# Patient Record
Sex: MALE | Race: WHITE | NOT HISPANIC OR LATINO | Employment: STUDENT | URBAN - METROPOLITAN AREA
[De-identification: names, ages, dates, MRNs, and addresses within clinical notes are randomized per-mention and may not be internally consistent; named-entity substitution may affect disease eponyms.]

---
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
## 2023-08-11 ENCOUNTER — APPOINTMENT (EMERGENCY)
Dept: CT IMAGING | Facility: HOSPITAL | Age: 18
End: 2023-08-11
Payer: COMMERCIAL

## 2023-08-11 ENCOUNTER — APPOINTMENT (EMERGENCY)
Dept: ULTRASOUND IMAGING | Facility: HOSPITAL | Age: 18
End: 2023-08-11
Payer: COMMERCIAL

## 2023-08-11 ENCOUNTER — HOSPITAL ENCOUNTER (EMERGENCY)
Facility: HOSPITAL | Age: 18
Discharge: HOME | End: 2023-08-11
Attending: EMERGENCY MEDICINE
Payer: COMMERCIAL

## 2023-08-11 ENCOUNTER — TELEPHONE (OUTPATIENT)
Dept: UROLOGY | Facility: AMBULATORY SURGERY CENTER | Age: 18
End: 2023-08-11

## 2023-08-11 VITALS
HEART RATE: 66 BPM | WEIGHT: 170 LBS | OXYGEN SATURATION: 97 % | DIASTOLIC BLOOD PRESSURE: 65 MMHG | RESPIRATION RATE: 18 BRPM | TEMPERATURE: 98.5 F | BODY MASS INDEX: 27.44 KG/M2 | SYSTOLIC BLOOD PRESSURE: 122 MMHG

## 2023-08-11 DIAGNOSIS — N45.1 EPIDIDYMITIS: ICD-10-CM

## 2023-08-11 DIAGNOSIS — R59.0 INGUINAL ADENOPATHY: Primary | ICD-10-CM

## 2023-08-11 DIAGNOSIS — D72.829 LEUKOCYTOSIS: ICD-10-CM

## 2023-08-11 LAB
ALBUMIN SERPL BCP-MCNC: 4.6 G/DL (ref 3.5–5)
ALP SERPL-CCNC: 57 U/L (ref 34–104)
ALT SERPL W P-5'-P-CCNC: 14 U/L (ref 7–52)
ANION GAP SERPL CALCULATED.3IONS-SCNC: 4 MMOL/L
AST SERPL W P-5'-P-CCNC: 21 U/L (ref 13–39)
BASOPHILS # BLD AUTO: 0.04 THOUSANDS/ÂΜL (ref 0–0.1)
BASOPHILS NFR BLD AUTO: 0 % (ref 0–1)
BILIRUB SERPL-MCNC: 0.88 MG/DL (ref 0.2–1)
BILIRUB UR QL STRIP: NEGATIVE
BUN SERPL-MCNC: 14 MG/DL (ref 5–25)
CALCIUM SERPL-MCNC: 9.2 MG/DL (ref 8.4–10.2)
CHLORIDE SERPL-SCNC: 108 MMOL/L (ref 96–108)
CLARITY UR: CLEAR
CO2 SERPL-SCNC: 28 MMOL/L (ref 21–32)
COLOR UR: YELLOW
CREAT SERPL-MCNC: 0.94 MG/DL (ref 0.6–1.3)
CRP SERPL QL: 1.6 MG/L
EOSINOPHIL # BLD AUTO: 0.06 THOUSAND/ÂΜL (ref 0–0.61)
EOSINOPHIL NFR BLD AUTO: 0 % (ref 0–6)
ERYTHROCYTE [DISTWIDTH] IN BLOOD BY AUTOMATED COUNT: 11.9 % (ref 11.6–15.1)
GFR SERPL CREATININE-BSD FRML MDRD: 116 ML/MIN/1.73SQ M
GLUCOSE SERPL-MCNC: 94 MG/DL (ref 65–140)
GLUCOSE UR STRIP-MCNC: NEGATIVE MG/DL
HCT VFR BLD AUTO: 42.8 % (ref 36.5–49.3)
HGB BLD-MCNC: 15.1 G/DL (ref 12–17)
HGB UR QL STRIP.AUTO: NEGATIVE
HIV 1+2 AB+HIV1 P24 AG SERPL QL IA: NORMAL
HIV1 P24 AG SER QL: NORMAL
IMM GRANULOCYTES # BLD AUTO: 0.06 THOUSAND/UL (ref 0–0.2)
IMM GRANULOCYTES NFR BLD AUTO: 0 % (ref 0–2)
KETONES UR STRIP-MCNC: NEGATIVE MG/DL
LACTATE SERPL-SCNC: 0.7 MMOL/L (ref 0.5–2)
LEUKOCYTE ESTERASE UR QL STRIP: NEGATIVE
LYMPHOCYTES # BLD AUTO: 1.33 THOUSANDS/ÂΜL (ref 0.6–4.47)
LYMPHOCYTES NFR BLD AUTO: 7 % (ref 14–44)
MCH RBC QN AUTO: 30.1 PG (ref 26.8–34.3)
MCHC RBC AUTO-ENTMCNC: 35.3 G/DL (ref 31.4–37.4)
MCV RBC AUTO: 85 FL (ref 82–98)
MONOCYTES # BLD AUTO: 0.9 THOUSAND/ÂΜL (ref 0.17–1.22)
MONOCYTES NFR BLD AUTO: 5 % (ref 4–12)
NEUTROPHILS # BLD AUTO: 16.11 THOUSANDS/ÂΜL (ref 1.85–7.62)
NEUTS SEG NFR BLD AUTO: 88 % (ref 43–75)
NITRITE UR QL STRIP: NEGATIVE
NRBC BLD AUTO-RTO: 0 /100 WBCS
PH UR STRIP.AUTO: 5.5 [PH]
PLATELET # BLD AUTO: 240 THOUSANDS/UL (ref 149–390)
PMV BLD AUTO: 10.3 FL (ref 8.9–12.7)
POTASSIUM SERPL-SCNC: 4 MMOL/L (ref 3.5–5.3)
PROCALCITONIN SERPL-MCNC: <0.05 NG/ML
PROT SERPL-MCNC: 7.3 G/DL (ref 6.4–8.4)
PROT UR STRIP-MCNC: NEGATIVE MG/DL
RBC # BLD AUTO: 5.02 MILLION/UL (ref 3.88–5.62)
SODIUM SERPL-SCNC: 140 MMOL/L (ref 135–147)
SP GR UR STRIP.AUTO: 1.02 (ref 1–1.03)
TREPONEMA PALLIDUM IGG+IGM AB [PRESENCE] IN SERUM OR PLASMA BY IMMUNOASSAY: NORMAL
URATE SERPL-MCNC: 5.8 MG/DL (ref 3.5–8.5)
UROBILINOGEN UR STRIP-ACNC: <2 MG/DL
WBC # BLD AUTO: 18.5 THOUSAND/UL (ref 4.31–10.16)

## 2023-08-11 PROCEDURE — 87040 BLOOD CULTURE FOR BACTERIA: CPT | Performed by: EMERGENCY MEDICINE

## 2023-08-11 PROCEDURE — 74177 CT ABD & PELVIS W/CONTRAST: CPT

## 2023-08-11 PROCEDURE — 80053 COMPREHEN METABOLIC PANEL: CPT | Performed by: EMERGENCY MEDICINE

## 2023-08-11 PROCEDURE — 83605 ASSAY OF LACTIC ACID: CPT | Performed by: EMERGENCY MEDICINE

## 2023-08-11 PROCEDURE — 36415 COLL VENOUS BLD VENIPUNCTURE: CPT | Performed by: EMERGENCY MEDICINE

## 2023-08-11 PROCEDURE — 76870 US EXAM SCROTUM: CPT

## 2023-08-11 PROCEDURE — 71260 CT THORAX DX C+: CPT

## 2023-08-11 PROCEDURE — 87181 SC STD AGAR DILUTION PER AGT: CPT | Performed by: EMERGENCY MEDICINE

## 2023-08-11 PROCEDURE — 87806 HIV AG W/HIV1&2 ANTB W/OPTIC: CPT | Performed by: EMERGENCY MEDICINE

## 2023-08-11 PROCEDURE — 87154 CUL TYP ID BLD PTHGN 6+ TRGT: CPT | Performed by: EMERGENCY MEDICINE

## 2023-08-11 PROCEDURE — 86140 C-REACTIVE PROTEIN: CPT | Performed by: EMERGENCY MEDICINE

## 2023-08-11 PROCEDURE — 84145 PROCALCITONIN (PCT): CPT | Performed by: EMERGENCY MEDICINE

## 2023-08-11 PROCEDURE — 86780 TREPONEMA PALLIDUM: CPT | Performed by: EMERGENCY MEDICINE

## 2023-08-11 PROCEDURE — 83615 LACTATE (LD) (LDH) ENZYME: CPT | Performed by: EMERGENCY MEDICINE

## 2023-08-11 PROCEDURE — 85025 COMPLETE CBC W/AUTO DIFF WBC: CPT | Performed by: EMERGENCY MEDICINE

## 2023-08-11 PROCEDURE — 87491 CHLMYD TRACH DNA AMP PROBE: CPT | Performed by: EMERGENCY MEDICINE

## 2023-08-11 PROCEDURE — 81003 URINALYSIS AUTO W/O SCOPE: CPT | Performed by: EMERGENCY MEDICINE

## 2023-08-11 PROCEDURE — 84550 ASSAY OF BLOOD/URIC ACID: CPT | Performed by: EMERGENCY MEDICINE

## 2023-08-11 PROCEDURE — 83625 ASSAY OF LDH ENZYMES: CPT | Performed by: EMERGENCY MEDICINE

## 2023-08-11 PROCEDURE — 87591 N.GONORRHOEAE DNA AMP PROB: CPT | Performed by: EMERGENCY MEDICINE

## 2023-08-11 RX ORDER — DOXYCYCLINE HYCLATE 100 MG/1
100 CAPSULE ORAL ONCE
Status: COMPLETED | OUTPATIENT
Start: 2023-08-11 | End: 2023-08-11

## 2023-08-11 RX ORDER — DOXYCYCLINE HYCLATE 100 MG/1
100 CAPSULE ORAL 2 TIMES DAILY
Qty: 20 CAPSULE | Refills: 0 | Status: SHIPPED | OUTPATIENT
Start: 2023-08-12 | End: 2023-08-15

## 2023-08-11 RX ORDER — KETOROLAC TROMETHAMINE 10 MG/1
10 TABLET, FILM COATED ORAL EVERY 6 HOURS PRN
Qty: 20 TABLET | Refills: 0 | Status: SHIPPED | OUTPATIENT
Start: 2023-08-11 | End: 2023-08-16

## 2023-08-11 RX ORDER — KETOROLAC TROMETHAMINE 30 MG/ML
30 INJECTION, SOLUTION INTRAMUSCULAR; INTRAVENOUS ONCE
Status: COMPLETED | OUTPATIENT
Start: 2023-08-11 | End: 2023-08-11

## 2023-08-11 RX ADMIN — IOHEXOL 100 ML: 350 INJECTION, SOLUTION INTRAVENOUS at 08:29

## 2023-08-11 RX ADMIN — LIDOCAINE HYDROCHLORIDE 500 MG: 10 INJECTION, SOLUTION EPIDURAL; INFILTRATION; INTRACAUDAL; PERINEURAL at 13:17

## 2023-08-11 RX ADMIN — KETOROLAC TROMETHAMINE 30 MG: 30 INJECTION, SOLUTION INTRAMUSCULAR; INTRAVENOUS at 13:16

## 2023-08-11 RX ADMIN — DOXYCYCLINE 100 MG: 100 CAPSULE ORAL at 13:15

## 2023-08-11 NOTE — QUICK NOTE
Urology covering [address] about patient presented to the emergency room for bilateral inguinal pain. He is tender on exam in the lymph node area. No skin changes or discharge. He has a history of inguinal lymphadenopathy    WBC: 18.5   WBC   Latest Ref Rng 4.31 - 10.16 Thousand/uL   12/19/2021 20.08 (H)    1/18/2022 15.01 (H)    5/2/2022 20.36 (H)    5/11/2022 12.62 (H)    6/2/2022 20.17 (H)    6/13/2022 7.00    8/11/2023 18.50 (H)         Urine unremarkable, uric acid, C-reactive protein, lactic acid, procalcitonin normal, HIV normal.  Syphilis, GC and chlamydia pending, blood cultures in process, EBV acute panel pending    Ultrasound scrotum and testicles unremarkable, exam benign per Dr. Parker [place]  CT scan:   IMPRESSION:     No splenomegaly or lymphadenopathy by size criteria.     Stable prominent but nonenlarged retroperitoneal lymph node since 1/18/2022. He had a cystoscopy August 2022 that was unremarkable with Dr. Reynaldo Ervin. He was seen by Dr. Francisca Turner July 2022. Had prior PET CT scan which showed prominent lymphadenopathy did not have any FDG uptake indicating probable reactive etiology however low-grade myeloproliferative disorder cannot be completely ruled out. At that time his WBC had returned to normal.  EGD was normal, colonoscopy was normal both completed July 2022. He was supposed to follow-up with a CT scan 3 months later which was never completed.     Reviewed with Dr. Aleah Barajas.,  Nothing from urological standpoint

## 2023-08-11 NOTE — TELEPHONE ENCOUNTER
Patient last seen on 8/18/22 with Dr. Funmilayo Rice in [place] ([place])    Patient's father stated [facility] wanted the patient to get a biopsy when his lymph nodes are swollen and he is currently in the ER in [place] because he is having a flare up     Patient's father calling to see if one of our provider's could call over to the [facility] to direct them to do this biopsy because he does not want to end up back in the ER again.  Patient's father gave the number [phone]    Patient's father can be reached at [phone]

## 2023-08-11 NOTE — ED PROVIDER NOTES
History  Chief Complaint   Patient presents with   • Groin Pain     Pt to er with complaints of the lymph nodes in his groin swelling. States that the pain is 10/10. Denies n/v. Pain does not radiate. This is a 78-year-old male who presents for evaluation of of recurrent inguinal adenopathy at least 12 episodes over the past 3 years without definitive etiology. Patient has had extensive workup including STD testing PET scan lymphoma blood evaluation without answers. He has also had EGD colonoscopy and cystoscopy. He was told to consider lymph node biopsy when he gets 1 of these acute flares. He is currently sexually active. Denies any other regions of adenopathy no nausea vomiting diarrhea fevers or chills. History provided by:  Patient and parent  Medical Problem  Location:  Inguinal  Quality:  Adenopathy  Severity:  Moderate  Onset quality:  Gradual  Timing:  Constant  Progression:  Waxing and waning  Chronicity:  Recurrent  Context:  Recurrent inguinal adenopathy  Associated symptoms: no fever        Prior to Admission Medications   Prescriptions Last Dose Informant Patient Reported? Taking?   ketorolac (TORADOL) 10 mg tablet  Self No No   Sig: Take 1 tablet (10 mg total) by mouth every 6 (six) hours as needed for moderate pain   ketorolac (TORADOL) 10 mg tablet   No Yes   Sig: Take 1 tablet (10 mg total) by mouth every 6 (six) hours as needed for moderate pain for up to 5 days   saccharomyces boulardii (FLORASTOR) 250 mg capsule   No No   Sig: Take 1 capsule (250 mg total) by mouth in the morning and 1 capsule (250 mg total) in the evening. Patient not taking: No sig reported      Facility-Administered Medications: None       History reviewed. No pertinent past medical history. History reviewed. No pertinent surgical history. History reviewed. No pertinent family history. I have reviewed and agree with the history as documented.     E-Cigarette/Vaping   • E-Cigarette Use Never User E-Cigarette/Vaping Substances   • Nicotine No    • THC No    • CBD No    • Flavoring No    • Other No    • Unknown No      Social History     Tobacco Use   • Smoking status: Never   • Smokeless tobacco: Never   Vaping Use   • Vaping Use: Never used   Substance Use Topics   • Alcohol use: Not Currently   • Drug use: Not Currently       Review of Systems   Constitutional: Negative for fever. Genitourinary: Negative for penile discharge. Inguinal adenopathy   All other systems reviewed and are negative. Physical Exam  Physical Exam  Constitutional:       General: He is not in acute distress. Appearance: He is not ill-appearing, toxic-appearing or diaphoretic. HENT:      Head: Normocephalic and atraumatic. Right Ear: External ear normal.      Left Ear: External ear normal.      Nose: Nose normal.   Eyes:      General:         Right eye: No discharge. Left eye: No discharge. Extraocular Movements: Extraocular movements intact. Pupils: Pupils are equal, round, and reactive to light. Cardiovascular:      Rate and Rhythm: Normal rate and regular rhythm. Pulses: Normal pulses. Heart sounds: No murmur heard. No friction rub. No gallop. Pulmonary:      Effort: Pulmonary effort is normal. No respiratory distress. Breath sounds: No stridor. No wheezing, rhonchi or rales. Abdominal:      General: There is no distension. Palpations: Abdomen is soft. Tenderness: There is no abdominal tenderness. There is no guarding or rebound. Genitourinary:     Penis: Normal.       Comments: No scrotal erythema or swelling some mild tenderness to palpation no genital lesions or discharge  Musculoskeletal:         General: No swelling, tenderness, deformity or signs of injury. Normal range of motion. Cervical back: Normal range of motion and neck supple. No rigidity or tenderness. Right lower leg: No edema. Left lower leg: No edema.    Skin: General: Skin is warm and dry. Coloration: Skin is not jaundiced. Findings: No bruising. Comments: Sunburn to the chest otherwise no skin lesions. There is no cervical or axillary adenopathy present on examination   Neurological:      General: No focal deficit present. Mental Status: He is alert and oriented to person, place, and time. Cranial Nerves: No cranial nerve deficit. Sensory: No sensory deficit. Motor: No weakness. Coordination: Coordination normal.      Gait: Gait normal.   Psychiatric:         Mood and Affect: Mood normal.         Behavior: Behavior normal.         Thought Content: Thought content normal.         Vital Signs  ED Triage Vitals   Temperature Pulse Respirations Blood Pressure SpO2   08/11/23 0640 08/11/23 0639 08/11/23 0639 08/11/23 0640 08/11/23 0639   98.5 °F (36.9 °C) 89 18 137/72 99 %      Temp Source Heart Rate Source Patient Position - Orthostatic VS BP Location FiO2 (%)   08/11/23 0640 08/11/23 0639 -- -- --   Oral Monitor         Pain Score       --                  Vitals:    08/11/23 0900 08/11/23 0930 08/11/23 1000 08/11/23 1030   BP: 121/58 116/66 107/57 105/61   Pulse: 84 83 66 59         Visual Acuity      ED Medications  Medications   cefTRIAXone (ROCEPHIN) 500 mg in lidocaine (PF) (XYLOCAINE-MPF) 1 % IM only syringe (has no administration in time range)   doxycycline hyclate (VIBRAMYCIN) capsule 100 mg (has no administration in time range)   ketorolac (TORADOL) injection 30 mg (has no administration in time range)   iohexol (OMNIPAQUE) 350 MG/ML injection (SINGLE-DOSE) 100 mL (100 mL Intravenous Given 8/11/23 0829)       Diagnostic Studies  Results Reviewed     Procedure Component Value Units Date/Time    Blood culture #1 [[id number]] Collected: 08/11/23 0806    Lab Status: Preliminary result Specimen: Blood from Arm, Left Updated: 08/11/23 1301     Blood Culture Received in Microbiology Lab. Culture in Progress.     Blood culture #2 [[id number]] Collected: 08/11/23 0752    Lab Status: Preliminary result Specimen: Blood from Arm, Right Updated: 08/11/23 1301     Blood Culture Received in Microbiology Lab. Culture in Progress. Lactate dehydrogenase, isoenzymes [[id number]] Collected: 08/11/23 1057    Lab Status: In process Specimen: Blood from Arm, Right Updated: 08/11/23 1104    Uric acid [[id number]]  (Normal) Collected: 08/11/23 0752    Lab Status: Final result Specimen: Blood from Arm, Right Updated: 08/11/23 1058     Uric Acid 5.8 mg/dL     Narrative:      N-acetyl-p-benzoquinone imine (metabolite of Acetaminophen) will generate erroneously low results in samples for patients that have taken an overdose of Acetaminophen. EBV acute panel [[id number]]     Lab Status: No result Specimen: Blood     UA w Reflex to Microscopic w Reflex to Culture [[id number]] Collected: 08/11/23 0806    Lab Status: Final result Specimen: Urine, Clean Catch Updated: 08/11/23 0851     Color, UA Yellow     Clarity, UA Clear     Specific Gravity, UA 1.025     pH, UA 5.5     Leukocytes, UA Negative     Nitrite, UA Negative     Protein, UA Negative mg/dl      Glucose, UA Negative mg/dl      Ketones, UA Negative mg/dl      Urobilinogen, UA <2.0 mg/dl      Bilirubin, UA Negative     Occult Blood, UA Negative    RAPID HIV 1/2 AB-AG COMBO for 15years old and above [[id number]]  (Normal) Collected: 08/11/23 0752    Lab Status: Final result Specimen: Blood from Arm, Right Updated: 08/11/23 0841     Rapid HIV 1 AND 2 Non-Reactive     HIV-1 P24 Ag Screen Non-Reactive    Narrative:      Negative for HIV-1 p24 Antigen. Negative for HIV-1 and/or HIV-2 Antibody.     Procalcitonin [[id number]]  (Normal) Collected: 08/11/23 0752    Lab Status: Final result Specimen: Blood from Arm, Right Updated: 08/11/23 0828     Procalcitonin <0.05 ng/ml     Comprehensive metabolic panel [[id number]] Collected: 08/11/23 0752    Lab Status: Final result Specimen: Blood from Arm, Right Updated: 08/11/23 0817     Sodium 140 mmol/L      Potassium 4.0 mmol/L      Chloride 108 mmol/L      CO2 28 mmol/L      ANION GAP 4 mmol/L      BUN 14 mg/dL      Creatinine 0.94 mg/dL      Glucose 94 mg/dL      Calcium 9.2 mg/dL      AST 21 U/L      ALT 14 U/L      Alkaline Phosphatase 57 U/L      Total Protein 7.3 g/dL      Albumin 4.6 g/dL      Total Bilirubin 0.88 mg/dL      eGFR 116 ml/min/1.73sq m     Narrative:      [place] guidelines for Chronic Kidney Disease (CKD):   •  Stage 1 with normal or high GFR (GFR > 90 mL/min/1.73 square meters)  •  Stage 2 Mild CKD (GFR = 60-89 mL/min/1.73 square meters)  •  Stage 3A Moderate CKD (GFR = 45-59 mL/min/1.73 square meters)  •  Stage 3B Moderate CKD (GFR = 30-44 mL/min/1.73 square meters)  •  Stage 4 Severe CKD (GFR = 15-29 mL/min/1.73 square meters)  •  Stage 5 End Stage CKD (GFR <15 mL/min/1.73 square meters)  Note: GFR calculation is accurate only with a steady state creatinine    C-reactive protein [[id number]]  (Normal) Collected: 08/11/23 0752    Lab Status: Final result Specimen: Blood from Arm, Right Updated: 08/11/23 0817     CRP 1.6 mg/L     Lactic acid, plasma (w/reflex if result > 2.0) [[id number]]  (Normal) Collected: 08/11/23 0752    Lab Status: Final result Specimen: Blood from Arm, Right Updated: 08/11/23 0815     LACTIC ACID 0.7 mmol/L     Narrative:      Result may be elevated if tourniquet was used during collection. David amplified DNA by PCR [[id number]] Collected: 08/11/23 0806    Lab Status:  In process Specimen: Urine, Other Updated: 08/11/23 0812    CBC and differential [[id number]]  (Abnormal) Collected: 08/11/23 0752    Lab Status: Final result Specimen: Blood from Arm, Right Updated: 08/11/23 0802     WBC 18.50 Thousand/uL      RBC 5.02 Million/uL      Hemoglobin 15.1 g/dL      Hematocrit 42.8 %      MCV 85 fL      MCH 30.1 pg      MCHC 35.3 g/dL      RDW 11.9 %      MPV 10.3 fL      Platelets 508 Thousands/uL nRBC 0 /100 WBCs      Neutrophils Relative 88 %      Immat GRANS % 0 %      Lymphocytes Relative 7 %      Monocytes Relative 5 %      Eosinophils Relative 0 %      Basophils Relative 0 %      Neutrophils Absolute 16.11 Thousands/µL      Immature Grans Absolute 0.06 Thousand/uL      Lymphocytes Absolute 1.33 Thousands/µL      Monocytes Absolute 0.90 Thousand/µL      Eosinophils Absolute 0.06 Thousand/µL      Basophils Absolute 0.04 Thousands/µL     RPR-Syphilis Screening (Total Syphilis IGG/IGM) [[id number]] Collected: 08/11/23 0752    Lab Status: In process Specimen: Blood from Arm, Right Updated: 08/11/23 0759                 US scrotum and testicles   Final Result by Blanca Emanuel MD (08/11 1201)      Unremarkable study. Workstation performed: [id number]         CT chest abdomen pelvis w contrast   Final Result by Kali Frazier MD (08/11 3051)      No splenomegaly or lymphadenopathy by size criteria. Stable prominent but nonenlarged retroperitoneal lymph node since 1/18/2022. Workstation performed: [id number]                    Procedures  Procedures         ED Course  ED Course as of 08/11/23 1308   Fri Aug 11, 2023   1241 Reviewed case with hematology oncology Dr. Gilbert Minor who recommends urology consultation. She would not recommend biopsy at this time and feels that the adenopathy is most likely reactionary. [phone] Also discussed case with urology covering [address] who states there is nothing from a urologic standpoint at this time. 1243 At this point we will treat patient with antibiotics and have him follow-up with his primary care physician for further evaluation   1307 Discussed case with patient's primary care Dr. Violette Khalil who will arrange for outpatient lymph node biopsy through general surgery         CRAFFT    Flowsheet Row Most Recent Value   CRAFFT Initial Screen: During the past 12 months, did you:    1. Drink any alcohol (more than a few sips)?   No Filed at: 08/11/2023 0640   2. Smoke any marijuana or hashish No Filed at: 08/11/2023 0640   3. Use anything else to get high? ("anything else" includes illegal drugs, over the counter and prescription drugs, and things that you sniff or 'brar')? No Filed at: 08/11/2023 0640                                          Medical Decision Making  Inguinal adenopathy differential includes lymphoma versus reactive infectious etiology STD workup in progress we will check lab work and CAT scan in addition to urinalysis    Amount and/or Complexity of Data Reviewed  Labs: ordered. Radiology: ordered. Disposition  Final diagnoses:   Inguinal adenopathy   Leukocytosis     Time reflects when diagnosis was documented in both MDM as applicable and the Disposition within this note     Time User Action Codes Description Comment    8/11/2023 12:14 PM Reola Adarsh Add [R59.0] Inguinal adenopathy     8/11/2023 12:14 PM Reola Adarsh Add [Y61.804] Leukocytosis     8/11/2023 12:46 PM Reola Adarsh Add [N45.1] Epididymitis       ED Disposition     ED Disposition   Discharge    Condition   Stable    Date/Time   Fri Aug 11, 2023 12:44 PM    Comment   Dusty Matias discharge to home/self care. Follow-up Information     Follow up With Specialties Details Why Contact [facility]  Today  [address] [place] [address],[address] Dr. Sifuentes Severe [id number] [phone]            Patient's Medications   Discharge Prescriptions    DOXYCYCLINE HYCLATE (VIBRAMYCIN) 100 MG CAPSULE    Take 1 capsule (100 mg total) by mouth 2 (two) times a day for 10 days Do not start before August 12, 2023. Start Date: 8/12/2023 End Date: 8/22/2023       Order Dose: 100 mg       Quantity: 20 capsule    Refills: 0       No discharge procedures on file.     PDMP Review     None          ED Provider  Electronically Signed by           Jose Bernal DO  08/11/23 6266

## 2023-08-12 ENCOUNTER — HOSPITAL ENCOUNTER (INPATIENT)
Facility: HOSPITAL | Age: 18
LOS: 3 days | Discharge: HOME | End: 2023-08-15
Attending: EMERGENCY MEDICINE | Admitting: STUDENT IN AN ORGANIZED HEALTH CARE EDUCATION/TRAINING PROGRAM
Payer: COMMERCIAL

## 2023-08-12 DIAGNOSIS — R51.9 NONINTRACTABLE EPISODIC HEADACHE, UNSPECIFIED HEADACHE TYPE: ICD-10-CM

## 2023-08-12 DIAGNOSIS — R59.0 INGUINAL LYMPHADENOPATHY: ICD-10-CM

## 2023-08-12 DIAGNOSIS — R78.81 BACTEREMIA: Primary | ICD-10-CM

## 2023-08-12 LAB
ALBUMIN SERPL BCP-MCNC: 4.5 G/DL (ref 3.5–5)
ALP SERPL-CCNC: 58 U/L (ref 34–104)
ALT SERPL W P-5'-P-CCNC: 15 U/L (ref 7–52)
ANION GAP SERPL CALCULATED.3IONS-SCNC: 7 MMOL/L
APTT PPP: 28 SECONDS (ref 23–37)
AST SERPL W P-5'-P-CCNC: 20 U/L (ref 13–39)
BASOPHILS # BLD AUTO: 0.05 THOUSANDS/ÂΜL (ref 0–0.1)
BASOPHILS NFR BLD AUTO: 1 % (ref 0–1)
BILIRUB SERPL-MCNC: 0.47 MG/DL (ref 0.2–1)
BUN SERPL-MCNC: 13 MG/DL (ref 5–25)
CALCIUM SERPL-MCNC: 9.1 MG/DL (ref 8.4–10.2)
CARDIAC TROPONIN I PNL SERPL HS: <2 NG/L
CHLORIDE SERPL-SCNC: 107 MMOL/L (ref 96–108)
CO2 SERPL-SCNC: 26 MMOL/L (ref 21–32)
CREAT SERPL-MCNC: 1.04 MG/DL (ref 0.6–1.3)
EOSINOPHIL # BLD AUTO: 0.15 THOUSAND/ÂΜL (ref 0–0.61)
EOSINOPHIL NFR BLD AUTO: 2 % (ref 0–6)
ERYTHROCYTE [DISTWIDTH] IN BLOOD BY AUTOMATED COUNT: 11.9 % (ref 11.6–15.1)
GFR SERPL CREATININE-BSD FRML MDRD: 102 ML/MIN/1.73SQ M
GLUCOSE SERPL-MCNC: 100 MG/DL (ref 65–140)
HCT VFR BLD AUTO: 42 % (ref 36.5–49.3)
HGB BLD-MCNC: 14.7 G/DL (ref 12–17)
IMM GRANULOCYTES # BLD AUTO: 0.02 THOUSAND/UL (ref 0–0.2)
IMM GRANULOCYTES NFR BLD AUTO: 0 % (ref 0–2)
INR PPP: 0.99 (ref 0.84–1.19)
LACTATE SERPL-SCNC: 1.5 MMOL/L (ref 0.5–2)
LYMPHOCYTES # BLD AUTO: 1.95 THOUSANDS/ÂΜL (ref 0.6–4.47)
LYMPHOCYTES NFR BLD AUTO: 25 % (ref 14–44)
MCH RBC QN AUTO: 29.8 PG (ref 26.8–34.3)
MCHC RBC AUTO-ENTMCNC: 35 G/DL (ref 31.4–37.4)
MCV RBC AUTO: 85 FL (ref 82–98)
MONOCYTES # BLD AUTO: 0.61 THOUSAND/ÂΜL (ref 0.17–1.22)
MONOCYTES NFR BLD AUTO: 8 % (ref 4–12)
NEUTROPHILS # BLD AUTO: 5 THOUSANDS/ÂΜL (ref 1.85–7.62)
NEUTS SEG NFR BLD AUTO: 64 % (ref 43–75)
NRBC BLD AUTO-RTO: 0 /100 WBCS
PLATELET # BLD AUTO: 260 THOUSANDS/UL (ref 149–390)
PMV BLD AUTO: 10.5 FL (ref 8.9–12.7)
POTASSIUM SERPL-SCNC: 3.6 MMOL/L (ref 3.5–5.3)
PROCALCITONIN SERPL-MCNC: 0.19 NG/ML
PROT SERPL-MCNC: 7.3 G/DL (ref 6.4–8.4)
PROTHROMBIN TIME: 13.8 SECONDS (ref 11.6–14.5)
RBC # BLD AUTO: 4.93 MILLION/UL (ref 3.88–5.62)
SODIUM SERPL-SCNC: 140 MMOL/L (ref 135–147)
WBC # BLD AUTO: 7.78 THOUSAND/UL (ref 4.31–10.16)

## 2023-08-12 PROCEDURE — 85025 COMPLETE CBC W/AUTO DIFF WBC: CPT | Performed by: EMERGENCY MEDICINE

## 2023-08-12 PROCEDURE — 85610 PROTHROMBIN TIME: CPT | Performed by: EMERGENCY MEDICINE

## 2023-08-12 PROCEDURE — 85730 THROMBOPLASTIN TIME PARTIAL: CPT | Performed by: EMERGENCY MEDICINE

## 2023-08-12 PROCEDURE — 87040 BLOOD CULTURE FOR BACTERIA: CPT | Performed by: EMERGENCY MEDICINE

## 2023-08-12 PROCEDURE — 84484 ASSAY OF TROPONIN QUANT: CPT | Performed by: EMERGENCY MEDICINE

## 2023-08-12 PROCEDURE — 99223 1ST HOSP IP/OBS HIGH 75: CPT | Performed by: STUDENT IN AN ORGANIZED HEALTH CARE EDUCATION/TRAINING PROGRAM

## 2023-08-12 PROCEDURE — 96365 THER/PROPH/DIAG IV INF INIT: CPT

## 2023-08-12 PROCEDURE — 99284 EMERGENCY DEPT VISIT MOD MDM: CPT

## 2023-08-12 PROCEDURE — 36415 COLL VENOUS BLD VENIPUNCTURE: CPT | Performed by: EMERGENCY MEDICINE

## 2023-08-12 PROCEDURE — 83605 ASSAY OF LACTIC ACID: CPT | Performed by: EMERGENCY MEDICINE

## 2023-08-12 PROCEDURE — 80053 COMPREHEN METABOLIC PANEL: CPT | Performed by: EMERGENCY MEDICINE

## 2023-08-12 PROCEDURE — 84145 PROCALCITONIN (PCT): CPT | Performed by: EMERGENCY MEDICINE

## 2023-08-12 PROCEDURE — 93005 ELECTROCARDIOGRAM TRACING: CPT

## 2023-08-12 PROCEDURE — 99285 EMERGENCY DEPT VISIT HI MDM: CPT | Performed by: EMERGENCY MEDICINE

## 2023-08-12 RX ORDER — MAGNESIUM HYDROXIDE/ALUMINUM HYDROXICE/SIMETHICONE 120; 1200; 1200 MG/30ML; MG/30ML; MG/30ML
30 SUSPENSION ORAL EVERY 6 HOURS PRN
Status: DISCONTINUED | OUTPATIENT
Start: 2023-08-12 | End: 2023-08-15 | Stop reason: HOSPADM

## 2023-08-12 RX ORDER — CEFTRIAXONE 1 G/50ML
1000 INJECTION, SOLUTION INTRAVENOUS EVERY 24 HOURS
Status: DISCONTINUED | OUTPATIENT
Start: 2023-08-13 | End: 2023-08-15

## 2023-08-12 RX ORDER — ONDANSETRON 2 MG/ML
4 INJECTION INTRAMUSCULAR; INTRAVENOUS EVERY 4 HOURS PRN
Status: DISCONTINUED | OUTPATIENT
Start: 2023-08-12 | End: 2023-08-15 | Stop reason: HOSPADM

## 2023-08-12 RX ORDER — POLYETHYLENE GLYCOL 3350 17 G/17G
17 POWDER, FOR SOLUTION ORAL DAILY PRN
Status: DISCONTINUED | OUTPATIENT
Start: 2023-08-12 | End: 2023-08-15 | Stop reason: HOSPADM

## 2023-08-12 RX ORDER — ACETAMINOPHEN 325 MG/1
650 TABLET ORAL EVERY 6 HOURS PRN
Status: DISCONTINUED | OUTPATIENT
Start: 2023-08-12 | End: 2023-08-15 | Stop reason: HOSPADM

## 2023-08-12 RX ORDER — CEFTRIAXONE 1 G/50ML
1000 INJECTION, SOLUTION INTRAVENOUS ONCE
Status: COMPLETED | OUTPATIENT
Start: 2023-08-12 | End: 2023-08-12

## 2023-08-12 RX ORDER — LANOLIN ALCOHOL/MO/W.PET/CERES
6 CREAM (GRAM) TOPICAL
Status: DISCONTINUED | OUTPATIENT
Start: 2023-08-12 | End: 2023-08-15 | Stop reason: HOSPADM

## 2023-08-12 RX ORDER — KETOROLAC TROMETHAMINE 30 MG/ML
15 INJECTION, SOLUTION INTRAMUSCULAR; INTRAVENOUS EVERY 6 HOURS PRN
Status: DISPENSED | OUTPATIENT
Start: 2023-08-12 | End: 2023-08-14

## 2023-08-12 RX ADMIN — CEFTRIAXONE 1000 MG: 1 INJECTION, SOLUTION INTRAVENOUS at 11:21

## 2023-08-12 RX ADMIN — KETOROLAC TROMETHAMINE 15 MG: 30 INJECTION, SOLUTION INTRAMUSCULAR; INTRAVENOUS at 14:31

## 2023-08-12 RX ADMIN — MELATONIN 6 MG: at 22:22

## 2023-08-12 RX ADMIN — KETOROLAC TROMETHAMINE 15 MG: 30 INJECTION, SOLUTION INTRAMUSCULAR; INTRAVENOUS at 21:04

## 2023-08-12 NOTE — ED PROVIDER NOTES
History  Chief Complaint   Patient presents with   • Abnormal Lab     Pt was called by pcp due to blood cultures being positive. Pt denies any complaints at this time. 19year old male presents for re-evaluation following positive blood cultures. Patient had presented in this emergency department yesterday for recurrent inguinal lymphadenopathy and had been discharged on doxycycline. Patient states the lymphadenopathy has improved since leaving the ED yesterday. No fevers or chills. No recent cough, congestion, nausea, vomiting or diarrhea. Patient has an outpatient biopsy of his inguinal nodes scheduled for Tuesday. Prior to Admission Medications   Prescriptions Last Dose Informant Patient Reported? Taking?   doxycycline hyclate (VIBRAMYCIN) 100 mg capsule   No No   Sig: Take 1 capsule (100 mg total) by mouth 2 (two) times a day for 10 days Do not start before August 12, 2023. ketorolac (TORADOL) 10 mg tablet   No No   Sig: Take 1 tablet (10 mg total) by mouth every 6 (six) hours as needed for moderate pain for up to 5 days   saccharomyces boulardii (FLORASTOR) 250 mg capsule   No No   Sig: Take 1 capsule (250 mg total) by mouth in the morning and 1 capsule (250 mg total) in the evening. Patient not taking: No sig reported      Facility-Administered Medications: None       History reviewed. No pertinent past medical history. History reviewed. No pertinent surgical history. History reviewed. No pertinent family history. I have reviewed and agree with the history as documented.     E-Cigarette/Vaping   • E-Cigarette Use Never User      E-Cigarette/Vaping Substances   • Nicotine No    • THC No    • CBD No    • Flavoring No    • Other No    • Unknown No      Social History     Tobacco Use   • Smoking status: Never   • Smokeless tobacco: Never   Vaping Use   • Vaping Use: Never used   Substance Use Topics   • Alcohol use: Not Currently   • Drug use: Not Currently       Review of Systems    Physical Exam  Physical Exam  Vitals and nursing note reviewed. HENT:      Head: Normocephalic and atraumatic. Eyes:      Conjunctiva/sclera: Conjunctivae normal.   Cardiovascular:      Rate and Rhythm: Normal rate and regular rhythm. Pulses: Normal pulses. Pulmonary:      Effort: Pulmonary effort is normal. No respiratory distress. Abdominal:      General: There is no distension. Genitourinary:     Comments: Tender bilateral inguinal lymphadenopathy  Skin:     General: Skin is warm and dry. Neurological:      Mental Status: He is alert.          Vital Signs  ED Triage Vitals [08/12/23 1042]   Temperature Pulse Respirations Blood Pressure SpO2   98.2 °F (36.8 °C) 75 18 130/80 99 %      Temp Source Heart Rate Source Patient Position - Orthostatic VS BP Location FiO2 (%)   Oral -- -- -- --      Pain Score       --           Vitals:    08/12/23 1042   BP: 130/80   Pulse: 75         Visual Acuity      ED Medications  Medications   cefTRIAXone (ROCEPHIN) IVPB (premix in dextrose) 1,000 mg 50 mL (1,000 mg Intravenous New Bag 8/12/23 1121)       Diagnostic Studies  Results Reviewed     Procedure Component Value Units Date/Time    Procalcitonin [[id number]]  (Normal) Collected: 08/12/23 1057    Lab Status: Final result Specimen: Blood from Arm, Right Updated: 08/12/23 1134     Procalcitonin 0.19 ng/ml     HS Troponin 0hr (reflex protocol) [[id number]]  (Normal) Collected: 08/12/23 1057    Lab Status: Final result Specimen: Blood from Arm, Right Updated: 08/12/23 1132     hs TnI 0hr <2 ng/L     HS Troponin I 2hr [[id number]]     Lab Status: No result Specimen: Blood     Comprehensive metabolic panel [[id number]] Collected: 08/12/23 1057    Lab Status: Final result Specimen: Blood from Arm, Right Updated: 08/12/23 1119     Sodium 140 mmol/L      Potassium 3.6 mmol/L      Chloride 107 mmol/L      CO2 26 mmol/L      ANION GAP 7 mmol/L      BUN 13 mg/dL      Creatinine 1.04 mg/dL      Glucose 100 mg/dL Calcium 9.1 mg/dL      AST 20 U/L      ALT 15 U/L      Alkaline Phosphatase 58 U/L      Total Protein 7.3 g/dL      Albumin 4.5 g/dL      Total Bilirubin 0.47 mg/dL      eGFR 102 ml/min/1.73sq m     Narrative:      [facility] guidelines for Chronic Kidney Disease (CKD):   •  Stage 1 with normal or high GFR (GFR > 90 mL/min/1.73 square meters)  •  Stage 2 Mild CKD (GFR = 60-89 mL/min/1.73 square meters)  •  Stage 3A Moderate CKD (GFR = 45-59 mL/min/1.73 square meters)  •  Stage 3B Moderate CKD (GFR = 30-44 mL/min/1.73 square meters)  •  Stage 4 Severe CKD (GFR = 15-29 mL/min/1.73 square meters)  •  Stage 5 End Stage CKD (GFR <15 mL/min/1.73 square meters)  Note: GFR calculation is accurate only with a steady state creatinine    Lactic acid [[id number]]  (Normal) Collected: 08/12/23 1057    Lab Status: Final result Specimen: Blood from Arm, Right Updated: 08/12/23 1118     LACTIC ACID 1.5 mmol/L     Narrative:      Result may be elevated if tourniquet was used during collection.     Protime-INR [[id number]]  (Normal) Collected: 08/12/23 1057    Lab Status: Final result Specimen: Blood from Arm, Right Updated: 08/12/23 1118     Protime 13.8 seconds      INR 0.99    APTT [[id number]]  (Normal) Collected: 08/12/23 1057    Lab Status: Final result Specimen: Blood from Arm, Right Updated: 08/12/23 1118     PTT 28 seconds     CBC and differential [[id number]] Collected: 08/12/23 1057    Lab Status: Final result Specimen: Blood from Arm, Right Updated: 08/12/23 1105     WBC 7.78 Thousand/uL      RBC 4.93 Million/uL      Hemoglobin 14.7 g/dL      Hematocrit 42.0 %      MCV 85 fL      MCH 29.8 pg      MCHC 35.0 g/dL      RDW 11.9 %      MPV 10.5 fL      Platelets 993 Thousands/uL      nRBC 0 /100 WBCs      Neutrophils Relative 64 %      Immat GRANS % 0 %      Lymphocytes Relative 25 %      Monocytes Relative 8 %      Eosinophils Relative 2 %      Basophils Relative 1 %      Neutrophils Absolute 5.00 Thousands/µL      Immature Grans Absolute 0.02 Thousand/uL      Lymphocytes Absolute 1.95 Thousands/µL      Monocytes Absolute 0.61 Thousand/µL      Eosinophils Absolute 0.15 Thousand/µL      Basophils Absolute 0.05 Thousands/µL     Blood culture #1 [[id number]] Collected: 08/12/23 1057    Lab Status: In process Specimen: Blood from Arm, Right Updated: 08/12/23 1102    Blood culture #2 [[id number]] Collected: 08/12/23 1058    Lab Status: In process Specimen: Blood from Arm, Left Updated: 08/12/23 1102                 No orders to display              Procedures  ECG 12 Lead Documentation Only    Date/Time: 8/12/2023 11:02 AM    Performed by: Kristan Hartley MD  Authorized by: Kristan Hartley MD    Indications / Diagnosis:  Bacteremia  ECG reviewed by me, the ED Provider: yes    Patient location:  ED  Previous ECG:     Previous ECG:  Compared to current    Comparison ECG info:  12/19/21 sinus tachycardia    Similarity:  No change  Interpretation:     Interpretation: normal    Rate:     ECG rate:  75    ECG rate assessment: normal    Rhythm:     Rhythm: sinus rhythm    Ectopy:     Ectopy: none    QRS:     QRS axis:  Normal    QRS intervals:  Normal  Conduction:     Conduction: normal    ST segments:     ST segments:  Normal  T waves:     T waves: normal               ED Course         CRAFFT    Flowsheet Row Most Recent Value   CRAFFT Initial Screen: During the past 12 months, did you:    1. Drink any alcohol (more than a few sips)? No Filed at: 08/12/2023 1141   2. Smoke any marijuana or hashish No Filed at: 08/12/2023 1141   3. Use anything else to get high? ("anything else" includes illegal drugs, over the counter and prescription drugs, and things that you sniff or 'brar')? No Filed at: 08/12/2023 1141                         Initial Sepsis Screening     Row Name 08/12/23 1127                Is the patient's history suggestive of a new or worsening infection?  Yes (Proceed)  -EE        Suspected source of infection suspect infection, source unknown  bacteremia  -EE        Indicate SIRS criteria --        Are two or more of the above signs & symptoms of infection both present and new to the patient? No  -EE              User Key  (r) = Recorded By, (t) = Taken By, (c) = Cosigned By    [facility] Name Provider Chico Wells MD Physician                              Medical Decision Making  19year old male presents for re-evaluation following 2/2 positive blood cultures which were drawn yesterday during a visit for inguinal lymphadenopathy. No current SIRS criteria. Per medical records, blood cultures grew Streptococcus agalactiae (Group B). IV Rocephin ordered. Patient admitted for further evaluation and management. Amount and/or Complexity of Data Reviewed  Labs: ordered. Risk  Prescription drug management. Disposition  Final diagnoses:   Bacteremia   Inguinal lymphadenopathy     Time reflects when diagnosis was documented in both MDM as applicable and the Disposition within this note     Time User Action Codes Description Comment    8/12/2023 11:17 AM Dane Melara Add [R78.81] Bacteremia     8/12/2023 11:18 AM Dane Duke [R59.0] Inguinal lymphadenopathy       ED Disposition     ED Disposition   Admit    Condition   Stable    Date/Time   Sat Aug 12, 2023 11:42 AM    Comment   Case was discussed with AUGUSTIN and the patient's admission status was agreed to be Admission Status: inpatient status to the service of Dr. Diego Machado . Follow-up Information    None         Patient's Medications   Discharge Prescriptions    No medications on file       No discharge procedures on file.     PDMP Review     None          ED Provider  Electronically Signed by           Beata Alexander MD  08/12/23 4195

## 2023-08-12 NOTE — RESULT ENCOUNTER NOTE
Called patient and informed him of positive blood cultures. He states he is currently out of town. I did advise him to go to the closest ER for further evaluation. He states he will try to go today, but if not he will return here tomorrow. He states he is currently on antibiotics and feeling better. Patient aware of the importance of getting rechecked.

## 2023-08-12 NOTE — ASSESSMENT & PLAN NOTE
Patient presenting today due to abnormal blood cultures  1/2 growing GBS    Patient presented on 8/11/2023 to the ED for growing pain that was 10 out of 10. Patient has a history of recurrent inguinal lymphadenopathy and has been followed up with urology, hematology oncology. He has had extensive work-up with PET scan multiple blood tests colonoscopy cystoscopy. Yesterday urology and oncology were notified and did not recommend any further intervention at this time and was to follow-up with his PCP for outpatient lymph node biopsy. Pt at the time did NOT meet SIRS criteria pt only had leukocytosis of 18.5 he received ceftriaxone and Toradol in the ED. Was discharged with Doxy.     Bld work today unremarkable  LA and procal negative  NOT meeting SIRS criteria  Repeat BC taken in the ED-> continue to follow  Was given CTX in the ED-> continue  F/u GC/chlamydia

## 2023-08-12 NOTE — PLAN OF CARE
Problem: PAIN - ADULT  Goal: Verbalizes/displays adequate comfort level or baseline comfort level  Description: Interventions:  - Encourage patient to monitor pain and request assistance  - Assess pain using appropriate pain scale  - Administer analgesics based on type and severity of pain and evaluate response  - Implement non-pharmacological measures as appropriate and evaluate response  - Consider cultural and social influences on pain and pain management  - Notify physician/advanced practitioner if interventions unsuccessful or patient reports new pain  Outcome: Progressing     Problem: SAFETY ADULT  Goal: Patient will remain free of falls  Description: INTERVENTIONS:  - Educate patient/family on patient safety including physical limitations  - Instruct patient to call for assistance with activity   - Consult OT/PT to assist with strengthening/mobility   - Keep Call bell within reach  - Keep bed low and locked with side rails adjusted as appropriate  - Keep care items and personal belongings within reach  - Initiate and maintain comfort rounds  - Make Fall Risk Sign visible to staff    - Apply yellow socks and bracelet for high fall risk patients  - Consider moving patient to room near nurses station  Outcome: Progressing

## 2023-08-12 NOTE — H&P
[facility]  H&P  Name: Gage Farrell 19 y.o. male I MRN: [id number]  Unit/Bed#: ED 08 I Date of Admission: 8/12/2023   Date of Service: 8/12/2023 I Hospital Day: 0      Assessment/Plan   * Positive blood culture  Assessment & Plan  Patient presenting today due to abnormal blood cultures  1/2 growing GBS    Patient presented on 8/11/2023 to the ED for growing pain that was 10 out of 10. Patient has a history of recurrent inguinal lymphadenopathy and has been followed up with urology, hematology oncology. He has had extensive work-up with PET scan multiple blood tests colonoscopy cystoscopy. Yesterday urology and oncology were notified and did not recommend any further intervention at this time and was to follow-up with his PCP for outpatient lymph node biopsy. Pt at the time did NOT meet SIRS criteria pt only had leukocytosis of 18.5    Bld work today unremarkable  LA and procal negative  NOT meeting SIRS criteria  Repeat BC taken in the ED-> continue to follow  Was given CTX in the ED-> continue  F/u GC/chlamydia     Inguinal lymphadenopathy  Assessment & Plan  See mgx above        VTE Pharmacologic Prophylaxis: VTE Score: 2 Low Risk (Score 0-2) - Encourage Ambulation. Code Status: No Order full code      Anticipated Length of Stay: Patient will be admitted on an inpatient basis with an anticipated length of stay of greater than 2 midnights secondary to + BC. Total Time Spent on Date of Encounter in care of patient: 65 minutes This time was spent on one or more of the following: performing physical exam; counseling and coordination of care; obtaining or reviewing history; documenting in the medical record; reviewing/ordering tests, medications or procedures; communicating with other healthcare professionals and discussing with patient's family/caregivers.     Chief Complaint: abnormal lab    History of Present Illness:  Gage Farrell is a 19 y.o. male with a PMH of inguinal lymphadenopathy who presents with abnormal lab of positive blood culture. He presented to the ED yesterday for severe groin pain. Has a history of inguinal lymphadenopathy. At the time patient did not meet SIRS criteria and had a leukocytosis. He was given ceftriaxone and Toradol in the ED. Hematology oncology as well as urology was consulted. They recommended outpatient follow-up. He was discharged with doxycycline. All other symptoms on review of systems was negative. .    Review of Systems:  Review of Systems    Past Medical and Surgical History:   History reviewed. No pertinent past medical history. History reviewed. No pertinent surgical history. Meds/Allergies:  Prior to Admission medications    Medication Sig Start Date End Date Taking? Authorizing Provider   doxycycline hyclate (VIBRAMYCIN) 100 mg capsule Take 1 capsule (100 mg total) by mouth 2 (two) times a day for 10 days Do not start before August 12, 2023. 8/12/23 8/22/23  Magy Khan DO   ketorolac (TORADOL) 10 mg tablet Take 1 tablet (10 mg total) by mouth every 6 (six) hours as needed for moderate pain for up to 5 days 8/11/23 8/16/23  Magy Khan DO   saccharomyces boulardii (FLORASTOR) 250 mg capsule Take 1 capsule (250 mg total) by mouth in the morning and 1 capsule (250 mg total) in the evening. Patient not taking: No sig reported 5/20/22 2000 SAMIR Campos-C     I have reviewed home medications using recent Epic encounter.     Allergies: No Known Allergies    Social History:  Marital Status: Single   Patient Pre-hospital Living Situation: Home  Patient Pre-hospital Level of Mobility: walks  Patient Pre-hospital Diet Restrictions: none  Substance Use History:   Social History     Substance and Sexual Activity   Alcohol Use Not Currently     Social History     Tobacco Use   Smoking Status Never   Smokeless Tobacco Never     Social History     Substance and Sexual Activity   Drug Use Not Currently       Family History:  History reviewed. No pertinent family history. Physical Exam:     Vitals:   Blood Pressure: 130/80 (08/12/23 1042)  Pulse: 75 (08/12/23 1042)  Temperature: 98.2 °F (36.8 °C) (08/12/23 1042)  Temp Source: Oral (08/12/23 1042)  Respirations: 18 (08/12/23 1042)  SpO2: 99 % (08/12/23 1042)    Physical Exam  Vitals and nursing note reviewed. Constitutional:       General: He is not in acute distress. Appearance: Normal appearance. He is not ill-appearing. HENT:      Head: Normocephalic and atraumatic. Cardiovascular:      Rate and Rhythm: Normal rate and regular rhythm. Pulses: Normal pulses. Heart sounds: Normal heart sounds. Pulmonary:      Effort: Pulmonary effort is normal.      Breath sounds: Normal breath sounds. Abdominal:      General: Abdomen is flat. Bowel sounds are normal.      Palpations: Abdomen is soft. Musculoskeletal:      Right lower leg: No edema. Left lower leg: No edema. Skin:     General: Skin is warm. Neurological:      General: No focal deficit present. Mental Status: He is alert and oriented to person, place, and time.           Additional Data:     Lab Results:  Results from last 7 days   Lab Units 08/12/23  1057   WBC Thousand/uL 7.78   HEMOGLOBIN g/dL 14.7   HEMATOCRIT % 42.0   PLATELETS Thousands/uL 260   NEUTROS PCT % 64   LYMPHS PCT % 25   MONOS PCT % 8   EOS PCT % 2     Results from last 7 days   Lab Units 08/12/23  1057   SODIUM mmol/L 140   POTASSIUM mmol/L 3.6   CHLORIDE mmol/L 107   CO2 mmol/L 26   BUN mg/dL 13   CREATININE mg/dL 1.04   ANION GAP mmol/L 7   CALCIUM mg/dL 9.1   ALBUMIN g/dL 4.5   TOTAL BILIRUBIN mg/dL 0.47   ALK PHOS U/L 58   ALT U/L 15   AST U/L 20   GLUCOSE RANDOM mg/dL 100     Results from last 7 days   Lab Units 08/12/23  1057   INR  0.99             Results from last 7 days   Lab Units 08/12/23  1057 08/11/23  0752   LACTIC ACID mmol/L 1.5 0.7   PROCALCITONIN ng/ml 0.19 <0.05       Lines/Drains:  Invasive Devices Peripheral Intravenous Line  Duration           Peripheral IV 06/02/22 Right Antecubital 436 days                    Imaging:   No orders to display   · Scrotal ultrasound: Unremarkable study  · CT chest abdomen pelvis with contrast: No splenomegaly or lymphadenopathy by size criteria. Stable prominent but nonenlarged retroperitoneal lymph node since 1/18/2022    EKG and Other Studies Reviewed on Admission:   · EKG Sinus rhythm with marked sinus arrhythmia   Otherwise normal ECG   When compared with ECG of 19-DEC-2021 19:15,   Vent. rate has decreased BY  38 BPM    ** Please Note: This note has been constructed using a voice recognition system.  **

## 2023-08-12 NOTE — ASSESSMENT & PLAN NOTE
Patient presenting today due to abnormal blood cultures  1/2 growing GBS sensitive to CTX    Patient presented on 8/11/2023 to the ED for growing pain that was 10 out of 10. Patient has a history of recurrent inguinal lymphadenopathy and has been followed up with urology, hematology oncology. He has had extensive work-up with PET scan multiple blood tests colonoscopy cystoscopy. Yesterday urology and oncology were notified and did not recommend any further intervention at this time and was to follow-up with his PCP for outpatient lymph node biopsy. Pt at the time did NOT meet SIRS criteria pt only had leukocytosis of 18.5 he received ceftriaxone and Toradol in the ED. Was discharged with Doxy.     Bld work today unremarkable  LA and procal negative  NOT meeting SIRS criteria  Repeat BC pending  continue CTX day 1  GC/chlamydia pending

## 2023-08-13 PROBLEM — R51.9 NONINTRACTABLE EPISODIC HEADACHE: Status: ACTIVE | Noted: 2023-08-13

## 2023-08-13 LAB
ALBUMIN SERPL BCP-MCNC: 4 G/DL (ref 3.5–5)
ALP SERPL-CCNC: 54 U/L (ref 34–104)
ALT SERPL W P-5'-P-CCNC: 13 U/L (ref 7–52)
ANION GAP SERPL CALCULATED.3IONS-SCNC: 4 MMOL/L
AST SERPL W P-5'-P-CCNC: 15 U/L (ref 13–39)
ATRIAL RATE: 75 BPM
BACTERIA BLD CULT: ABNORMAL
BILIRUB SERPL-MCNC: 0.5 MG/DL (ref 0.2–1)
BUN SERPL-MCNC: 13 MG/DL (ref 5–25)
CALCIUM SERPL-MCNC: 8.9 MG/DL (ref 8.4–10.2)
CHLORIDE SERPL-SCNC: 111 MMOL/L (ref 96–108)
CO2 SERPL-SCNC: 27 MMOL/L (ref 21–32)
CREAT SERPL-MCNC: 0.91 MG/DL (ref 0.6–1.3)
ERYTHROCYTE [DISTWIDTH] IN BLOOD BY AUTOMATED COUNT: 11.9 % (ref 11.6–15.1)
GFR SERPL CREATININE-BSD FRML MDRD: 120 ML/MIN/1.73SQ M
GLUCOSE SERPL-MCNC: 107 MG/DL (ref 65–140)
GP B STREP DNA BLD POS QL NAA+NON-PROBE: DETECTED
GRAM STN SPEC: ABNORMAL
HCT VFR BLD AUTO: 40.9 % (ref 36.5–49.3)
HGB BLD-MCNC: 14.3 G/DL (ref 12–17)
MAGNESIUM SERPL-MCNC: 2 MG/DL (ref 1.9–2.7)
MCH RBC QN AUTO: 30.1 PG (ref 26.8–34.3)
MCHC RBC AUTO-ENTMCNC: 35 G/DL (ref 31.4–37.4)
MCV RBC AUTO: 86 FL (ref 82–98)
P AXIS: 62 DEGREES
PLATELET # BLD AUTO: 239 THOUSANDS/UL (ref 149–390)
PMV BLD AUTO: 10.6 FL (ref 8.9–12.7)
POTASSIUM SERPL-SCNC: 4.1 MMOL/L (ref 3.5–5.3)
PR INTERVAL: 168 MS
PROT SERPL-MCNC: 6.5 G/DL (ref 6.4–8.4)
QRS AXIS: 55 DEGREES
QRSD INTERVAL: 98 MS
QT INTERVAL: 376 MS
QTC INTERVAL: 419 MS
RBC # BLD AUTO: 4.75 MILLION/UL (ref 3.88–5.62)
SODIUM SERPL-SCNC: 142 MMOL/L (ref 135–147)
T WAVE AXIS: 43 DEGREES
VENTRICULAR RATE: 75 BPM
WBC # BLD AUTO: 7.48 THOUSAND/UL (ref 4.31–10.16)

## 2023-08-13 PROCEDURE — 85027 COMPLETE CBC AUTOMATED: CPT | Performed by: STUDENT IN AN ORGANIZED HEALTH CARE EDUCATION/TRAINING PROGRAM

## 2023-08-13 PROCEDURE — 80053 COMPREHEN METABOLIC PANEL: CPT | Performed by: STUDENT IN AN ORGANIZED HEALTH CARE EDUCATION/TRAINING PROGRAM

## 2023-08-13 PROCEDURE — 93010 ELECTROCARDIOGRAM REPORT: CPT | Performed by: INTERNAL MEDICINE

## 2023-08-13 PROCEDURE — 99233 SBSQ HOSP IP/OBS HIGH 50: CPT | Performed by: STUDENT IN AN ORGANIZED HEALTH CARE EDUCATION/TRAINING PROGRAM

## 2023-08-13 PROCEDURE — 99447 NTRPROF PH1/NTRNET/EHR 11-20: CPT | Performed by: INTERNAL MEDICINE

## 2023-08-13 PROCEDURE — 83735 ASSAY OF MAGNESIUM: CPT | Performed by: STUDENT IN AN ORGANIZED HEALTH CARE EDUCATION/TRAINING PROGRAM

## 2023-08-13 RX ADMIN — CEFTRIAXONE 1000 MG: 1 INJECTION, SOLUTION INTRAVENOUS at 10:50

## 2023-08-13 RX ADMIN — MELATONIN 6 MG: at 22:07

## 2023-08-13 RX ADMIN — KETOROLAC TROMETHAMINE 15 MG: 30 INJECTION, SOLUTION INTRAMUSCULAR; INTRAVENOUS at 19:41

## 2023-08-13 NOTE — PLAN OF CARE
Problem: PAIN - ADULT  Goal: Verbalizes/displays adequate comfort level or baseline comfort level  Description: Interventions:  - Encourage patient to monitor pain and request assistance  - Assess pain using appropriate pain scale  - Administer analgesics based on type and severity of pain and evaluate response  - Implement non-pharmacological measures as appropriate and evaluate response  - Consider cultural and social influences on pain and pain management  - Notify physician/advanced practitioner if interventions unsuccessful or patient reports new pain  Outcome: Progressing     Problem: INFECTION - ADULT  Goal: Absence or prevention of progression during hospitalization  Description: INTERVENTIONS:  - Assess and monitor for signs and symptoms of infection  - Monitor lab/diagnostic results  - Monitor all insertion sites, i.e. indwelling lines, tubes, and drains  - Monitor endotracheal if appropriate and nasal secretions for changes in amount and color  - [place] appropriate cooling/warming therapies per order  - Administer medications as ordered  - Instruct and encourage patient and family to use good hand hygiene technique  - Identify and instruct in appropriate isolation precautions for identified infection/condition  Outcome: Progressing  Goal: Absence of fever/infection during neutropenic period  Description: INTERVENTIONS:  - Monitor WBC    Outcome: Progressing     Problem: SAFETY ADULT  Goal: Patient will remain free of falls  Description: INTERVENTIONS:  - Educate patient/family on patient safety including physical limitations  - Instruct patient to call for assistance with activity   - Consult OT/PT to assist with strengthening/mobility   - Keep Call bell within reach  - Keep bed low and locked with side rails adjusted as appropriate  - Keep care items and personal belongings within reach  - Initiate and maintain comfort rounds  - Make Fall Risk Sign visible to staff  - Offer Toileting every 2 Hours, in advance of need  - Initiate/Maintain alarm  - Obtain necessary fall risk management equipment  - Apply yellow socks and bracelet for high fall risk patients  - Consider moving patient to room near nurses station  Outcome: Progressing  Goal: Maintain or return to baseline ADL function  Description: INTERVENTIONS:  -  Assess patient's ability to carry out ADLs; assess patient's baseline for ADL function and identify physical deficits which impact ability to perform ADLs (bathing, care of mouth/teeth, toileting, grooming, dressing, etc.)  - Assess/evaluate cause of self-care deficits   - Assess range of motion  - Assess patient's mobility; develop plan if impaired  - Assess patient's need for assistive devices and provide as appropriate  - Encourage maximum independence but intervene and supervise when necessary  - Involve family in performance of ADLs  - Assess for home care needs following discharge   - Consider OT consult to assist with ADL evaluation and planning for discharge  - Provide patient education as appropriate  Outcome: Progressing  Goal: Maintains/Returns to pre admission functional level  Description: INTERVENTIONS:  - Perform BMAT or MOVE assessment daily.   - Set and communicate daily mobility goal to care team and patient/family/caregiver. - Collaborate with rehabilitation services on mobility goals if consulted  - Perform Range of Motion 3 times a day. - Reposition patient every 3 hours.   - Dangle patient 3 times a day  - Stand patient 3 times a day  - Ambulate patient 3 times a day  - Out of bed to chair 3 times a day   - Out of bed for meals 3 times a day  - Out of bed for toileting  - Record patient progress and toleration of activity level   Outcome: Progressing     Problem: DISCHARGE PLANNING  Goal: Discharge to home or other facility with appropriate resources  Description: INTERVENTIONS:  - Identify barriers to discharge w/patient and caregiver  - Arrange for needed discharge resources and transportation as appropriate  - Identify discharge learning needs (meds, wound care, etc.)  - Arrange for interpretive services to assist at discharge as needed  - Refer to Case Management Department for coordinating discharge planning if the patient needs post-hospital services based on physician/advanced practitioner order or complex needs related to functional status, cognitive ability, or social support system  Outcome: Progressing     Problem: Knowledge Deficit  Goal: Patient/family/caregiver demonstrates understanding of disease process, treatment plan, medications, and discharge instructions  Description: Complete learning assessment and assess knowledge base.   Interventions:  - Provide teaching at level of understanding  - Provide teaching via preferred learning methods  Outcome: Progressing

## 2023-08-13 NOTE — PROGRESS NOTES
[facility]  Progress Note  Name: Edie Bueno  MRN: [id number]  Unit/Bed#: -01 I Date of Admission: 8/12/2023   Date of Service: 8/13/2023 I Hospital Day: 1    Assessment/Plan   * Positive blood culture  Assessment & Plan  Patient presenting today due to abnormal blood cultures  1/2 growing GBS sensitive to CTX    Patient presented on 8/11/2023 to the ED for growing pain that was 10 out of 10. Patient has a history of recurrent inguinal lymphadenopathy and has been followed up with urology, hematology oncology. He has had extensive work-up with PET scan multiple blood tests colonoscopy cystoscopy. Yesterday urology and oncology were notified and did not recommend any further intervention at this time and was to follow-up with his PCP for outpatient lymph node biopsy. Pt at the time did NOT meet SIRS criteria pt only had leukocytosis of 18.5 he received ceftriaxone and Toradol in the ED. Was discharged with Doxy. Bld work today unremarkable  LA and procal negative  NOT meeting SIRS criteria  Repeat BC pending  continue CTX day 1  GC/chlamydia pending    Inguinal lymphadenopathy  Assessment & Plan  See mgx above  Consult h/o  Consult IR for inguinal bx  Npo at midnight on monday             VTE Pharmacologic Prophylaxis: VTE Score: 2 Low Risk (Score 0-2) - Encourage Ambulation. Patient Centered Rounds: I performed bedside rounds with nursing staff today. Discussions with Specialists or Other Care Team Provider: CM, H/O, IR    Education and Discussions with Family / Patient: Updated  (father) at bedside.     Total Time Spent on Date of Encounter in care of patient: 35 minutes This time was spent on one or more of the following: performing physical exam; counseling and coordination of care; obtaining or reviewing history; documenting in the medical record; reviewing/ordering tests, medications or procedures; communicating with other healthcare professionals and discussing with patient's family/caregivers. Current Length of Stay: 1 day(s)  Current Patient Status: Inpatient   Certification Statement: The patient will continue to require additional inpatient hospital stay due to + [facility]  Discharge Plan: Anticipate discharge in 24-48 hrs to home. Code Status: Level 1 - Full Code    Subjective:   Susu Pearson was seen and examined at bedside. No acute events overnight. Discussed plan of care. All questions and concerns were answered and addressed. Has no acute complaints plaints at this time. Objective:     Vitals:   Temp (24hrs), Av.5 °F (36.4 °C), Min:97.3 °F (36.3 °C), Max:98.2 °F (36.8 °C)    Temp:  [97.3 °F (36.3 °C)-98.2 °F (36.8 °C)] 97.3 °F (36.3 °C)  HR:  [73-93] 90  Resp:  [16-18] 16  BP: (124-130)/(68-80) 124/71  SpO2:  [98 %-100 %] 99 %  Body mass index is 27.44 kg/m². Input and Output Summary (last 24 hours): Intake/Output Summary (Last 24 hours) at 2023 0957  Last data filed at 2023 0914  Gross per 24 hour   Intake 590 ml   Output --   Net 590 ml       Physical Exam:   Physical Exam   Vitals and nursing note reviewed. Constitutional:       General: He is not in acute distress. Appearance: Normal appearance. He is not ill-appearing. HENT:      Head: Normocephalic and atraumatic. Cardiovascular:      Rate and Rhythm: Normal rate and regular rhythm. Pulses: Normal pulses. Heart sounds: Normal heart sounds. Pulmonary:      Effort: Pulmonary effort is normal.      Breath sounds: Normal breath sounds. Abdominal:      General: Abdomen is flat. Bowel sounds are normal.      Palpations: Abdomen is soft. Musculoskeletal:      Right lower leg: No edema. Left lower leg: No edema. Skin:     General: Skin is warm. Neurological:      General: No focal deficit present. Mental Status: He is alert and oriented to person, place, and time.      Additional Data:     Labs:  Results from last 7 days   Lab Units 08/13/23  0454 08/12/23  1057   WBC Thousand/uL 7.48 7.78   HEMOGLOBIN g/dL 14.3 14.7   HEMATOCRIT % 40.9 42.0   PLATELETS Thousands/uL 239 260   NEUTROS PCT %  --  64   LYMPHS PCT %  --  25   MONOS PCT %  --  8   EOS PCT %  --  2     Results from last 7 days   Lab Units 08/13/23  0454   SODIUM mmol/L 142   POTASSIUM mmol/L 4.1   CHLORIDE mmol/L 111*   CO2 mmol/L 27   BUN mg/dL 13   CREATININE mg/dL 0.91   ANION GAP mmol/L 4   CALCIUM mg/dL 8.9   ALBUMIN g/dL 4.0   TOTAL BILIRUBIN mg/dL 0.50   ALK PHOS U/L 54   ALT U/L 13   AST U/L 15   GLUCOSE RANDOM mg/dL 107     Results from last 7 days   Lab Units 08/12/23  1057   INR  0.99             Results from last 7 days   Lab Units 08/12/23  1057 08/11/23  0752   LACTIC ACID mmol/L 1.5 0.7   PROCALCITONIN ng/ml 0.19 <0.05       Lines/Drains:  Invasive Devices     Peripheral Intravenous Line  Duration           Peripheral IV 08/12/23 Right Antecubital 1 day                      Imaging: No pertinent imaging reviewed. Recent Cultures (last 7 days):   Results from last 7 days   Lab Units 08/12/23  1058 08/12/23  1057 08/11/23  0806 08/11/23  0752   BLOOD CULTURE  Received in Microbiology Lab. Culture in Progress. Received in Microbiology Lab. Culture in Progress. No Growth at 24 hrs.  Streptococcus agalactiae (Group B)*   GRAM STAIN RESULT   --   --   --  Gram positive cocci in chains*       Last 24 Hours Medication List:   Current Facility-Administered Medications   Medication Dose Route Frequency Provider Last Rate   • acetaminophen  650 mg Oral Q6H PRN Chidi Patrick MD     • aluminum-magnesium hydroxide-simethicone  30 mL Oral Q6H PRN Chidi Patrick MD     • cefTRIAXone  1,000 mg Intravenous Q24H Chidi Patrick MD     • ketorolac  15 mg Intravenous Q6H PRN Chidi Patrick MD     • melatonin  6 mg Oral HS Bertrand Hammond PA-C     • ondansetron  4 mg Intravenous Q4H PRN Chidi Patrick MD     • polyethylene glycol  17 g Oral Daily PRN Chidi Patrick MD Today, Patient Was Seen By: Jessi Landers MD    **Please Note: This note may have been constructed using a voice recognition system. **

## 2023-08-13 NOTE — ASSESSMENT & PLAN NOTE
Pt with a history of headaches that are throbbing located in the posterior cerebrum cerebellar area intense severe to the point of tears occurring while working out no auras.  Pt works out at the same time daily drinks 1 gallon of H2O daily improving with cardio prior to lifting and deep breaths     Believe that these maybe cluster HA may benefit from propranolol for maintenance/prophylactic and triptan during occurrence    Recommending neuro referral on discharge and keeping a journal of HA occurances

## 2023-08-13 NOTE — ASSESSMENT & PLAN NOTE
Patient presenting today due to abnormal blood cultures  1/2 growing GBS sensitive to CTX    Patient presented on 8/11/2023 to the ED for growing pain that was 10 out of 10. Patient has a history of recurrent inguinal lymphadenopathy and has been followed up with urology, hematology oncology. He has had extensive work-up with PET scan multiple blood tests colonoscopy cystoscopy. Yesterday urology and oncology were notified and did not recommend any further intervention at this time and was to follow-up with his PCP for outpatient lymph node biopsy. Pt at the time did NOT meet SIRS criteria pt only had leukocytosis of 18.5 he received ceftriaxone and Toradol in the ED. Was discharged with Doxy.     Bld work today unremarkable  LA and procal negative  NOT meeting SIRS criteria  Repeat BC NGTD @ 24h  continue CTX day 2  GC/chlamydia pending

## 2023-08-13 NOTE — PLAN OF CARE
Problem: PAIN - ADULT  Goal: Verbalizes/displays adequate comfort level or baseline comfort level  Description: Interventions:  - Encourage patient to monitor pain and request assistance  - Assess pain using appropriate pain scale  - Administer analgesics based on type and severity of pain and evaluate response  - Implement non-pharmacological measures as appropriate and evaluate response  - Consider cultural and social influences on pain and pain management  - Notify physician/advanced practitioner if interventions unsuccessful or patient reports new pain  Outcome: Progressing     Problem: INFECTION - ADULT  Goal: Absence or prevention of progression during hospitalization  Description: INTERVENTIONS:  - Assess and monitor for signs and symptoms of infection  - Monitor lab/diagnostic results  - Monitor all insertion sites, i.e. indwelling lines, tubes, and drains  - Monitor endotracheal if appropriate and nasal secretions for changes in amount and color  - [place] appropriate cooling/warming therapies per order  - Administer medications as ordered  - Instruct and encourage patient and family to use good hand hygiene technique  - Identify and instruct in appropriate isolation precautions for identified infection/condition  Outcome: Progressing  Goal: Absence of fever/infection during neutropenic period  Description: INTERVENTIONS:  - Monitor WBC    Outcome: Progressing     Problem: SAFETY ADULT  Goal: Patient will remain free of falls  Description: INTERVENTIONS:  - Educate patient/family on patient safety including physical limitations  - Instruct patient to call for assistance with activity   - Consult OT/PT to assist with strengthening/mobility   - Keep Call bell within reach  - Keep bed low and locked with side rails adjusted as appropriate  - Keep care items and personal belongings within reach  - Initiate and maintain comfort rounds  - Make Fall Risk Sign visible to staff  - Offer Toileting every 2 Hours, in advance of need  - Initiate/Maintain alarm  - Obtain necessary fall risk management equipment:   - Apply yellow socks and bracelet for high fall risk patients  - Consider moving patient to room near nurses station  Outcome: Progressing  Goal: Maintain or return to baseline ADL function  Description: INTERVENTIONS:  -  Assess patient's ability to carry out ADLs; assess patient's baseline for ADL function and identify physical deficits which impact ability to perform ADLs (bathing, care of mouth/teeth, toileting, grooming, dressing, etc.)  - Assess/evaluate cause of self-care deficits   - Assess range of motion  - Assess patient's mobility; develop plan if impaired  - Assess patient's need for assistive devices and provide as appropriate  - Encourage maximum independence but intervene and supervise when necessary  - Involve family in performance of ADLs  - Assess for home care needs following discharge   - Consider OT consult to assist with ADL evaluation and planning for discharge  - Provide patient education as appropriate  Outcome: Progressing  Goal: Maintains/Returns to pre admission functional level  Description: INTERVENTIONS:  - Perform BMAT or MOVE assessment daily.   - Set and communicate daily mobility goal to care team and patient/family/caregiver. - Collaborate with rehabilitation services on mobility goals if consulted  - Perform Range of Motion 2 times a day. - Reposition patient every 2 hours.   - Dangle patient 2 times a day  - Stand patient 2 times a day  - Ambulate patient 2 times a day  - Out of bed to chair 2 times a day   - Out of bed for meals 2 times a day  - Out of bed for toileting  - Record patient progress and toleration of activity level   Outcome: Progressing     Problem: DISCHARGE PLANNING  Goal: Discharge to home or other facility with appropriate resources  Description: INTERVENTIONS:  - Identify barriers to discharge w/patient and caregiver  - Arrange for needed discharge resources and transportation as appropriate  - Identify discharge learning needs (meds, wound care, etc.)  - Arrange for interpretive services to assist at discharge as needed  - Refer to Case Management Department for coordinating discharge planning if the patient needs post-hospital services based on physician/advanced practitioner order or complex needs related to functional status, cognitive ability, or social support system  Outcome: Progressing     Problem: Knowledge Deficit  Goal: Patient/family/caregiver demonstrates understanding of disease process, treatment plan, medications, and discharge instructions  Description: Complete learning assessment and assess knowledge base.   Interventions:  - Provide teaching at level of understanding  - Provide teaching via preferred learning methods  Outcome: Progressing

## 2023-08-13 NOTE — ASSESSMENT & PLAN NOTE
See mgx above  Consult h/o  Consult IR for inguinal bx  Npo at midnight on Monday    Could this be immunological? Or hormonal?    Referral to immunology and endocrinology on discharge  Also recommending journal account of LND

## 2023-08-13 NOTE — TELEMEDICINE
e-Consult ([facility])  - Interventional Radiology  Leonardo Rose 19 y.o. male MRN: [id number]  Unit/Bed#: -01 Encounter: [id number]          Interventional Radiology has been consulted to evaluate [address] to   Consult performed by: Daysi Hewitt MD  Consult ordered by: Lenka Lopez MD        08/13/23    Assessment/Recommendation:     19year old male presenting with inguinal pain. History of inguinal and mesenteric/RP prominent nodes that have been followed with serial CT scans, including a PET/CT which was negative. Plan for US guided inguinal lymph node core biopsy vs FNA    11-20 minutes, >50% of the total time devoted to medical consultative verbal/EMR discussion between providers. Written report will be generated in the EMR. Thank you for allowing Interventional Radiology to participate in the care of Leonardo Rose. Please don't hesitate to call or TigerText us with any questions.      Daysi Hewitt MD

## 2023-08-14 LAB
ANION GAP SERPL CALCULATED.3IONS-SCNC: 5 MMOL/L
BACTERIA BLD CULT: NORMAL
BASOPHILS # BLD AUTO: 0.05 THOUSANDS/ÂΜL (ref 0–0.1)
BASOPHILS NFR BLD AUTO: 1 % (ref 0–1)
BUN SERPL-MCNC: 16 MG/DL (ref 5–25)
C TRACH DNA SPEC QL NAA+PROBE: NEGATIVE
CALCIUM SERPL-MCNC: 8.9 MG/DL (ref 8.4–10.2)
CHLORIDE SERPL-SCNC: 110 MMOL/L (ref 96–108)
CO2 SERPL-SCNC: 25 MMOL/L (ref 21–32)
CREAT SERPL-MCNC: 0.82 MG/DL (ref 0.6–1.3)
EOSINOPHIL # BLD AUTO: 0.21 THOUSAND/ÂΜL (ref 0–0.61)
EOSINOPHIL NFR BLD AUTO: 3 % (ref 0–6)
ERYTHROCYTE [DISTWIDTH] IN BLOOD BY AUTOMATED COUNT: 11.9 % (ref 11.6–15.1)
GFR SERPL CREATININE-BSD FRML MDRD: 127 ML/MIN/1.73SQ M
GLUCOSE SERPL-MCNC: 101 MG/DL (ref 65–140)
HCT VFR BLD AUTO: 42 % (ref 36.5–49.3)
HGB BLD-MCNC: 14.5 G/DL (ref 12–17)
IMM GRANULOCYTES # BLD AUTO: 0.03 THOUSAND/UL (ref 0–0.2)
IMM GRANULOCYTES NFR BLD AUTO: 0 % (ref 0–2)
LDH SERPL-CCNC: 160 IU/L (ref 121–224)
LDH1 CFR SERPL ELPH: 25 % (ref 17–32)
LDH2 CFR SERPL ELPH: 27 % (ref 25–40)
LDH3 CFR SERPL ELPH: 23 % (ref 17–27)
LDH4 CFR SERPL ELPH: 12 % (ref 5–13)
LDH5 CFR SERPL ELPH: 13 % (ref 4–20)
LYMPHOCYTES # BLD AUTO: 3.06 THOUSANDS/ÂΜL (ref 0.6–4.47)
LYMPHOCYTES NFR BLD AUTO: 42 % (ref 14–44)
MCH RBC QN AUTO: 30.1 PG (ref 26.8–34.3)
MCHC RBC AUTO-ENTMCNC: 34.5 G/DL (ref 31.4–37.4)
MCV RBC AUTO: 87 FL (ref 82–98)
MONOCYTES # BLD AUTO: 0.63 THOUSAND/ÂΜL (ref 0.17–1.22)
MONOCYTES NFR BLD AUTO: 9 % (ref 4–12)
N GONORRHOEA DNA SPEC QL NAA+PROBE: NEGATIVE
NEUTROPHILS # BLD AUTO: 3.39 THOUSANDS/ÂΜL (ref 1.85–7.62)
NEUTS SEG NFR BLD AUTO: 45 % (ref 43–75)
NRBC BLD AUTO-RTO: 0 /100 WBCS
PLATELET # BLD AUTO: 261 THOUSANDS/UL (ref 149–390)
PMV BLD AUTO: 10.7 FL (ref 8.9–12.7)
POTASSIUM SERPL-SCNC: 4 MMOL/L (ref 3.5–5.3)
RBC # BLD AUTO: 4.82 MILLION/UL (ref 3.88–5.62)
SODIUM SERPL-SCNC: 140 MMOL/L (ref 135–147)
WBC # BLD AUTO: 7.37 THOUSAND/UL (ref 4.31–10.16)

## 2023-08-14 PROCEDURE — 85025 COMPLETE CBC W/AUTO DIFF WBC: CPT | Performed by: STUDENT IN AN ORGANIZED HEALTH CARE EDUCATION/TRAINING PROGRAM

## 2023-08-14 PROCEDURE — 99222 1ST HOSP IP/OBS MODERATE 55: CPT

## 2023-08-14 PROCEDURE — 99233 SBSQ HOSP IP/OBS HIGH 50: CPT | Performed by: STUDENT IN AN ORGANIZED HEALTH CARE EDUCATION/TRAINING PROGRAM

## 2023-08-14 PROCEDURE — 99222 1ST HOSP IP/OBS MODERATE 55: CPT | Performed by: SURGERY

## 2023-08-14 PROCEDURE — 80048 BASIC METABOLIC PNL TOTAL CA: CPT | Performed by: STUDENT IN AN ORGANIZED HEALTH CARE EDUCATION/TRAINING PROGRAM

## 2023-08-14 RX ADMIN — CEFTRIAXONE 1000 MG: 1 INJECTION, SOLUTION INTRAVENOUS at 10:30

## 2023-08-14 RX ADMIN — KETOROLAC TROMETHAMINE 15 MG: 30 INJECTION, SOLUTION INTRAMUSCULAR; INTRAVENOUS at 10:34

## 2023-08-14 RX ADMIN — MELATONIN 6 MG: at 22:02

## 2023-08-14 NOTE — H&P (VIEW-ONLY)
Consultation - Leonardo Rose 19 y.o. male MRN: [id number]    Unit/Bed#: -01 Encounter: [id number]      Assessment/Plan     Assessment/Plan:  Inguinal lymphadenopathy  -Pt with intermittent 3 year h/o inguinal lymphadenopathy with pain. Workup c/o cystoscopy, colonoscopy and PET of which have all been negative. -CT:  Stable prominent but nonenlarged retroperitoneal lymph node since 1/18/2022.  -US scrotum: Unremarkable study. -exam: normal LN b/l groin with mild tenderness today. Plan: Will schedule outpt LN biopsy  -discussed plan with Dr. Wili Miguel and medicine service    History of Present Illness     HPI: Leonardo Rose is a 19y.o. year old male with PMH of bilateral inguinal lymphadenopathy and pain that occurs intermittently over the last 3 years. Patient states that he has been good for about 1 year. He denies any trauma, burning with urination, night sweats or unintentional weight loss. He has gone through a work-up which consisted of a cystoscopy, colonoscopy and a PET scan of which all were negative. He states when the swelling and pain occurs, he does develop some chills. Otherwise, he has no other symptoms. He states that taking Toradol does help. He is interested/agreeable to a lymph node biopsy. Inpatient Consult to Surgical Oncology  Consult performed by: Yoni Messina PA-C  Consult ordered by: Lenka Lopez MD          Review of Systems   Constitutional: Positive for chills. Negative for fatigue, fever and unexpected weight change. HENT: Negative for ear pain and sore throat. Eyes: Negative for pain and visual disturbance. Respiratory: Negative for cough and shortness of breath. Cardiovascular: Negative for chest pain and palpitations. Gastrointestinal: Negative for abdominal pain and vomiting. Genitourinary: Negative for difficulty urinating, dysuria, hematuria, penile pain, scrotal swelling and testicular pain. As per HPI.     Musculoskeletal: Negative for arthralgias and back pain. Skin: Negative for color change and rash. Neurological: Negative for seizures and syncope. All other systems reviewed and are negative. Historical Information   History reviewed. No pertinent past medical history. History reviewed. No pertinent surgical history. Social History   Social History     Substance and Sexual Activity   Alcohol Use Not Currently     Social History     Substance and Sexual Activity   Drug Use Not Currently     Social History     Tobacco Use   Smoking Status Never   Smokeless Tobacco Never     E-Cigarette/Vaping   • E-Cigarette Use Never User      E-Cigarette/Vaping Substances   • Nicotine No    • THC No    • CBD No    • Flavoring No    • Other No    • Unknown No       Family History: non-contributory    Meds/Allergies   all current active meds have been reviewed  No Known Allergies    Objective   Vitals: Blood pressure 113/59, pulse 56, temperature (!) 97.2 °F (36.2 °C), resp. rate 14, height 5' 6" (1.676 m), weight 77.1 kg (170 lb), SpO2 99 %. Intake/Output Summary (Last 24 hours) at 8/14/2023 1157  Last data filed at 8/13/2023 1839  Gross per 24 hour   Intake 360 ml   Output --   Net 360 ml     Invasive Devices     Peripheral Intravenous Line  Duration           Peripheral IV 08/12/23 Right Antecubital 2 days                Physical Exam  Vitals and nursing note reviewed. Constitutional:       General: He is not in acute distress. Appearance: He is well-developed. HENT:      Head: Normocephalic and atraumatic. Eyes:      Conjunctiva/sclera: Conjunctivae normal.   Cardiovascular:      Rate and Rhythm: Normal rate and regular rhythm. Heart sounds: No murmur heard. Pulmonary:      Effort: Pulmonary effort is normal. No respiratory distress. Breath sounds: Normal breath sounds. Abdominal:      General: Bowel sounds are normal.      Palpations: Abdomen is soft. Tenderness: There is no abdominal tenderness.    Genitourinary: Penis: Normal.       Testes: Normal.      Comments: Normal size lymph nodes noted on a lean young man. Mild tenderness on exam.   Musculoskeletal:         General: No swelling. Cervical back: Neck supple. Skin:     General: Skin is warm and dry. Capillary Refill: Capillary refill takes less than 2 seconds. Neurological:      General: No focal deficit present. Mental Status: He is alert and oriented to person, place, and time. Psychiatric:         Mood and Affect: Mood normal.         Behavior: Behavior normal.         Lab Results: I have personally reviewed pertinent reports. Imaging Studies: I have personally reviewed pertinent reports. EKG, Pathology, and Other Studies: I have personally reviewed pertinent reports. VTE Prophylaxis: Reason for no pharmacologic prophylaxis Young ambulatory patient. Code Status: Level 1 - Full Code  Advance Directive and Living Will:      Power of :    POLST:      Counseling / Coordination of Care  Total floor / unit time spent today 30 minutes. Greater than 50% of total time was spent with the patient and / or family counseling and / or coordination of care. A description of the counseling / coordination of care: in my consult note.

## 2023-08-14 NOTE — ASSESSMENT & PLAN NOTE
See mgx above  h/o recommending excisional lymph node biopsy  Surgical oncology will hopefully schedule surgery outpatient for excisional biopsy later this week    Could this be immunological? Or hormonal?    Referral to immunology and endocrinology on discharge  Also recommending journal account of LND

## 2023-08-14 NOTE — PLAN OF CARE
Problem: PAIN - ADULT  Goal: Verbalizes/displays adequate comfort level or baseline comfort level  Description: Interventions:  - Encourage patient to monitor pain and request assistance  - Assess pain using appropriate pain scale  - Administer analgesics based on type and severity of pain and evaluate response  - Implement non-pharmacological measures as appropriate and evaluate response  - Consider cultural and social influences on pain and pain management  - Notify physician/advanced practitioner if interventions unsuccessful or patient reports new pain  Outcome: Progressing     Problem: INFECTION - ADULT  Goal: Absence or prevention of progression during hospitalization  Description: INTERVENTIONS:  - Assess and monitor for signs and symptoms of infection  - Monitor lab/diagnostic results  - Monitor all insertion sites, i.e. indwelling lines, tubes, and drains  - Monitor endotracheal if appropriate and nasal secretions for changes in amount and color  - [place] appropriate cooling/warming therapies per order  - Administer medications as ordered  - Instruct and encourage patient and family to use good hand hygiene technique  - Identify and instruct in appropriate isolation precautions for identified infection/condition  Outcome: Progressing  Goal: Absence of fever/infection during neutropenic period  Description: INTERVENTIONS:  - Monitor WBC    Outcome: Progressing     Problem: SAFETY ADULT  Goal: Patient will remain free of falls  Description: INTERVENTIONS:  - Educate patient/family on patient safety including physical limitations  - Instruct patient to call for assistance with activity   - Consult OT/PT to assist with strengthening/mobility   - Keep Call bell within reach  - Keep bed low and locked with side rails adjusted as appropriate  - Keep care items and personal belongings within reach  - Initiate and maintain comfort rounds  - Make Fall Risk Sign visible to staff  - Offer Toileting every 2 Hours, in advance of need  - Initiate/Maintain alarm  - Obtain necessary fall risk management equipment  - Apply yellow socks and bracelet for high fall risk patients  - Consider moving patient to room near nurses station  Outcome: Progressing  Goal: Maintain or return to baseline ADL function  Description: INTERVENTIONS:  -  Assess patient's ability to carry out ADLs; assess patient's baseline for ADL function and identify physical deficits which impact ability to perform ADLs (bathing, care of mouth/teeth, toileting, grooming, dressing, etc.)  - Assess/evaluate cause of self-care deficits   - Assess range of motion  - Assess patient's mobility; develop plan if impaired  - Assess patient's need for assistive devices and provide as appropriate  - Encourage maximum independence but intervene and supervise when necessary  - Involve family in performance of ADLs  - Assess for home care needs following discharge   - Consider OT consult to assist with ADL evaluation and planning for discharge  - Provide patient education as appropriate  Outcome: Progressing  Goal: Maintains/Returns to pre admission functional level  Description: INTERVENTIONS:  - Perform BMAT or MOVE assessment daily.   - Set and communicate daily mobility goal to care team and patient/family/caregiver. - Collaborate with rehabilitation services on mobility goals if consulted  - Perform Range of Motion 3 times a day. - Reposition patient every 3 hours.   - Dangle patient 3 times a day  - Stand patient 3 times a day  - Ambulate patient 3 times a day  - Out of bed to chair 3 times a day   - Out of bed for meals 3 times a day  - Out of bed for toileting  - Record patient progress and toleration of activity level   Outcome: Progressing     Problem: DISCHARGE PLANNING  Goal: Discharge to home or other facility with appropriate resources  Description: INTERVENTIONS:  - Identify barriers to discharge w/patient and caregiver  - Arrange for needed discharge resources and transportation as appropriate  - Identify discharge learning needs (meds, wound care, etc.)  - Arrange for interpretive services to assist at discharge as needed  - Refer to Case Management Department for coordinating discharge planning if the patient needs post-hospital services based on physician/advanced practitioner order or complex needs related to functional status, cognitive ability, or social support system  Outcome: Progressing     Problem: Knowledge Deficit  Goal: Patient/family/caregiver demonstrates understanding of disease process, treatment plan, medications, and discharge instructions  Description: Complete learning assessment and assess knowledge base.   Interventions:  - Provide teaching at level of understanding  - Provide teaching via preferred learning methods  Outcome: Progressing

## 2023-08-14 NOTE — UTILIZATION REVIEW
Initial Clinical Review    Admission: Date/Time/Statement:   Admission Orders (From admission, onward)     Ordered        08/12/23 1142  INPATIENT ADMISSION  Once                      Orders Placed This Encounter   Procedures   • INPATIENT ADMISSION     Standing Status:   Standing     Number of Occurrences:   1     Order Specific Question:   Level of Care     Answer:   Med Surg [16]     Order Specific Question:   Estimated length of stay     Answer:   More than 2 Midnights     Order Specific Question:   Certification     Answer:   I certify that inpatient services are medically necessary for this patient for a duration of greater than two midnights. See H&P and MD Progress Notes for additional information about the patient's course of treatment. ED Arrival Information     Expected   -    Arrival   8/12/2023 10:23    Acuity   Urgent            Means of arrival   Walk-In    Escorted by   Friend    Service   Hospitalist    Admission type   Emergency            Arrival complaint   abnormal labs           Chief Complaint   Patient presents with   • Abnormal Lab     Pt was called by pcp due to blood cultures being positive. Pt denies any complaints at this time. Initial Presentation: 19 y.o. male from home to ED, per PCP, admitted inpatient due to Positive Blood culture/Inguinal lymphadenopathy. Seen in ED 8/11/23  For recurrent inguinal lymphadenopathy and discharged on doxycycline. Has been followed with Urology, heme/onc for same in the past.   Called back for + blood cultures - now showing strep agalactiae. Feels lymphadenopathy improved. On exam:  Tender bilateral inguinal lymphadenopathy. Wbc 7.78. In the ED repeat blood cultures done. Given ceftriaxone. Plan is continue ceftriaxone, follow cultures. Pain control. Date: 8/13/23    Day 2: no new complaints.  + Blood cultures. Plan is continue ceftriaxone. Consult heme/onc. Consult IR. Pain control and using IV Toradol.      8/13/23 per IR - patient with inguinal pain and has history of inguinal and mesenteric/RP prominent nodes that have been followed with serial CT scans, including a PET/CT which was negative. Plan for US guided inguinal lymph node core biopsy vs FNA    ED Triage Vitals   Temperature Pulse Respirations Blood Pressure SpO2   08/12/23 1042 08/12/23 1042 08/12/23 1042 08/12/23 1042 08/12/23 1042   98.2 °F (36.8 °C) 75 18 130/80 99 %      Temp Source Heart Rate Source Patient Position - Orthostatic VS BP Location FiO2 (%)   08/12/23 1042 08/12/23 1420 08/12/23 1425 08/12/23 1420 --   Oral Monitor Lying Left arm       Pain Score       08/12/23 1300       No Pain          Wt Readings from Last 1 Encounters:   08/12/23 77.1 kg (170 lb)     Additional Vital Signs:   08/14/23 07:59:02 97.2 °F (36.2 °C) Abnormal  56 -- 113/59 77 99 % -- --   08/13/23 22:07:44 97.2 °F (36.2 °C) Abnormal  79 14 134/79 97 97 % -- --   08/13/23 14:28:51 97.3 °F (36.3 °C) Abnormal  89 16 135/73 94 99 % None (Room air) Lying   08/13/23 08:09:26 97.3 °F (36.3 °C) Abnormal  93 16 124/71 89 100 % -- --   08/12/23 20:51:32 97.3 °F (36.3 °C) Abnormal  76 -- 126/71 89 98 % -- --   08/12/23 14:27:27 97.6 °F (36.4 °C) 73 16 125/68 87 99 % None (Room air) Lying   08/12/23 1425 97.3 °F (36.3 °C) Abnormal  -- 18 -- -- 99 % None (Room air)      Pertinent Labs/Diagnostic Test Results:   IR biopsy inguinal lymph node    (Results Pending)     8/11/23 ct chest abdomen pelvis No splenomegaly or lymphadenopathy by size criteria. Stable prominent but nonenlarged retroperitoneal lymph node since 1/18/2022 8/11/23 US scrotum and testicles Unremarkable study    8/12/23 ecg Sinus rhythm with marked sinus arrhythmia  Otherwise normal ECG  When compared with ECG of 19-DEC-2021 19:15,  Vent.  rate has decreased BY  38 BPM    Results from last 7 days   Lab Units 08/14/23  0614 08/13/23  0454 08/12/23  1057 08/11/23  0752   WBC Thousand/uL 7.37 7.48 7.78 18.50*   HEMOGLOBIN g/dL 14.5 14.3 14.7 15.1   HEMATOCRIT % 42.0 40.9 42.0 42.8   PLATELETS Thousands/uL 261 239 260 240   NEUTROS ABS Thousands/µL 3.39  --  5.00 16.11*     Results from last 7 days   Lab Units 08/14/23  0614 08/13/23  0454 08/12/23  1057 08/11/23  0752   SODIUM mmol/L 140 142 140 140   POTASSIUM mmol/L 4.0 4.1 3.6 4.0   CHLORIDE mmol/L 110* 111* 107 108   CO2 mmol/L 25 27 26 28   ANION GAP mmol/L 5 4 7 4   BUN mg/dL 16 13 13 14   CREATININE mg/dL 0.82 0.91 1.04 0.94   EGFR ml/min/1.73sq m 127 120 102 116   CALCIUM mg/dL 8.9 8.9 9.1 9.2   MAGNESIUM mg/dL  --  2.0  --   --      Results from last 7 days   Lab Units 08/13/23  0454 08/12/23  1057 08/11/23  0752   AST U/L 15 20 21   ALT U/L 13 15 14   ALK PHOS U/L 54 58 57   TOTAL PROTEIN g/dL 6.5 7.3 7.3   ALBUMIN g/dL 4.0 4.5 4.6   TOTAL BILIRUBIN mg/dL 0.50 0.47 0.88     Results from last 7 days   Lab Units 08/14/23  0614 08/13/23  0454 08/12/23  1057 08/11/23  0752   GLUCOSE RANDOM mg/dL 101 107 100 94     Results from last 7 days   Lab Units 08/12/23  1057   HS TNI 0HR ng/L <2     Results from last 7 days   Lab Units 08/12/23  1057   PROTIME seconds 13.8   INR  0.99   PTT seconds 28     Results from last 7 days   Lab Units 08/12/23  1057 08/11/23  0752   PROCALCITONIN ng/ml 0.19 <0.05     Results from last 7 days   Lab Units 08/12/23  1057 08/11/23  0752   LACTIC ACID mmol/L 1.5 0.7     Results from last 7 days   Lab Units 08/11/23  0752   CRP mg/L 1.6     Results from last 7 days   Lab Units 08/11/23  0806   CLARITY UA  Clear   COLOR UA  Yellow   SPEC GRAV UA  1.025   PH UA  5.5   GLUCOSE UA mg/dl Negative   KETONES UA mg/dl Negative   BLOOD UA  Negative   PROTEIN UA mg/dl Negative   NITRITE UA  Negative   BILIRUBIN UA  Negative   UROBILINOGEN UA (BE) mg/dl <2.0   LEUKOCYTES UA  Negative     Results from last 7 days   Lab Units 08/12/23  1058 08/12/23  1057 08/11/23  0806 08/11/23  0752   BLOOD CULTURE  No Growth at 24 hrs. No Growth at 24 hrs. No Growth at 48 hrs. Streptococcus agalactiae (Group B)*   GRAM STAIN RESULT   --   --   --  Gram positive cocci in chains*       ED Treatment:   Medication Administration from 08/12/2023 1023 to 08/12/2023 1419       Date/Time Order Dose Route Action Comments     08/12/2023 1121 EDT cefTRIAXone (ROCEPHIN) IVPB (premix in dextrose) 1,000 mg 50 mL 1,000 mg Intravenous New Bag --        History reviewed. No pertinent past medical history. Present on Admission:  • Inguinal lymphadenopathy      Admitting Diagnosis: Bacteremia [R78.81]  Inguinal lymphadenopathy [R59.0]  Abnormal laboratory test [R89.9]  Age/Sex: 19 y.o. male  Admission Orders:  08/12/23 1142 inpatient   Scheduled Medications:  cefTRIAXone, 1,000 mg, Intravenous, Q24H  melatonin, 6 mg, Oral, HS    Continuous IV Infusions: none      PRN Meds:  acetaminophen, 650 mg, Oral, Q6H PRN  aluminum-magnesium hydroxide-simethicone, 30 mL, Oral, Q6H PRN  ketorolac, 15 mg, Intravenous, Q6H PRN x 2 8/12, x 1 8/13, x 1 8/14  ondansetron, 4 mg, Intravenous, Q4H PRN  polyethylene glycol, 17 g, Oral, Daily PRN      IP CONSULT TO ONCOLOGY  INPATIENT CONSULT TO IR    Network Utilization Review Department  ATTENTION: Please call with any questions or concerns to [phone] and carefully listen to the prompts so that you are directed to the right person. All voicemails are confidential.  Regina Wylie all requests for admission clinical reviews, approved or denied determinations and any other requests to dedicated fax number below belonging to the campus where the patient is receiving treatment.  List of dedicated fax numbers for the Facilities:  [place] DENIALS (Administrative/Medical Necessity) [phone]   [facility] [address] [place] [address] [phone]   [facility] [phone]   [address] [phone]   [facility] [address] [address] [facility] [address] [address]  [phone]

## 2023-08-14 NOTE — CONSULTS
Consultation - Nikko Donahue 19 y.o. male MRN: [id number]    Unit/Bed#: -01 Encounter: [id number]      Assessment/Plan     Assessment/Plan:  Inguinal lymphadenopathy  -Pt with intermittent 3 year h/o inguinal lymphadenopathy with pain. Workup c/o cystoscopy, colonoscopy and PET of which have all been negative. -CT:  Stable prominent but nonenlarged retroperitoneal lymph node since 1/18/2022.  -US scrotum: Unremarkable study. -exam: normal LN b/l groin with mild tenderness today. Plan: Will schedule outpt LN biopsy  -discussed plan with Dr. Tay and medicine service    History of Present Illness     HPI: Nikko Donahue is a 19y.o. year old male with PMH of bilateral inguinal lymphadenopathy and pain that occurs intermittently over the last 3 years. Patient states that he has been good for about 1 year. He denies any trauma, burning with urination, night sweats or unintentional weight loss. He has gone through a work-up which consisted of a cystoscopy, colonoscopy and a PET scan of which all were negative. He states when the swelling and pain occurs, he does develop some chills. Otherwise, he has no other symptoms. He states that taking Toradol does help. He is interested/agreeable to a lymph node biopsy. Inpatient Consult to Surgical Oncology  Consult performed by: Ival Canavan, PA-C  Consult ordered by: Karen Davidson MD          Review of Systems   Constitutional: Positive for chills. Negative for fatigue, fever and unexpected weight change. HENT: Negative for ear pain and sore throat. Eyes: Negative for pain and visual disturbance. Respiratory: Negative for cough and shortness of breath. Cardiovascular: Negative for chest pain and palpitations. Gastrointestinal: Negative for abdominal pain and vomiting. Genitourinary: Negative for difficulty urinating, dysuria, hematuria, penile pain, scrotal swelling and testicular pain. As per HPI.     Musculoskeletal: Negative for arthralgias and back pain. Skin: Negative for color change and rash. Neurological: Negative for seizures and syncope. All other systems reviewed and are negative. Historical Information   History reviewed. No pertinent past medical history. History reviewed. No pertinent surgical history. Social History   Social History     Substance and Sexual Activity   Alcohol Use Not Currently     Social History     Substance and Sexual Activity   Drug Use Not Currently     Social History     Tobacco Use   Smoking Status Never   Smokeless Tobacco Never     E-Cigarette/Vaping   • E-Cigarette Use Never User      E-Cigarette/Vaping Substances   • Nicotine No    • THC No    • CBD No    • Flavoring No    • Other No    • Unknown No       Family History: non-contributory    Meds/Allergies   all current active meds have been reviewed  No Known Allergies    Objective   Vitals: Blood pressure 113/59, pulse 56, temperature (!) 97.2 °F (36.2 °C), resp. rate 14, height 5' 6" (1.676 m), weight 77.1 kg (170 lb), SpO2 99 %. Intake/Output Summary (Last 24 hours) at 8/14/2023 1157  Last data filed at 8/13/2023 1839  Gross per 24 hour   Intake 360 ml   Output --   Net 360 ml     Invasive Devices     Peripheral Intravenous Line  Duration           Peripheral IV 08/12/23 Right Antecubital 2 days                Physical Exam  Vitals and nursing note reviewed. Constitutional:       General: He is not in acute distress. Appearance: He is well-developed. HENT:      Head: Normocephalic and atraumatic. Eyes:      Conjunctiva/sclera: Conjunctivae normal.   Cardiovascular:      Rate and Rhythm: Normal rate and regular rhythm. Heart sounds: No murmur heard. Pulmonary:      Effort: Pulmonary effort is normal. No respiratory distress. Breath sounds: Normal breath sounds. Abdominal:      General: Bowel sounds are normal.      Palpations: Abdomen is soft. Tenderness: There is no abdominal tenderness.    Genitourinary: Penis: Normal.       Testes: Normal.      Comments: Normal size lymph nodes noted on a lean young man. Mild tenderness on exam.   Musculoskeletal:         General: No swelling. Cervical back: Neck supple. Skin:     General: Skin is warm and dry. Capillary Refill: Capillary refill takes less than 2 seconds. Neurological:      General: No focal deficit present. Mental Status: He is alert and oriented to person, place, and time. Psychiatric:         Mood and Affect: Mood normal.         Behavior: Behavior normal.         Lab Results: I have personally reviewed pertinent reports. Imaging Studies: I have personally reviewed pertinent reports. EKG, Pathology, and Other Studies: I have personally reviewed pertinent reports. VTE Prophylaxis: Reason for no pharmacologic prophylaxis Young ambulatory patient. Code Status: Level 1 - Full Code  Advance Directive and Living Will:      Power of :    POLST:      Counseling / Coordination of Care  Total floor / unit time spent today 30 minutes. Greater than 50% of total time was spent with the patient and / or family counseling and / or coordination of care. A description of the counseling / coordination of care: in my consult note.

## 2023-08-14 NOTE — PROGRESS NOTES
[address] [facility]  Progress Note  Name: Shilo Benjamin  MRN: [id number]  Unit/Bed#: -01 I Date of Admission: 8/12/2023   Date of Service: 8/14/2023 I Hospital Day: 2    Assessment/Plan   * Positive blood culture  Assessment & Plan  Patient presenting today due to abnormal blood cultures  1/2 growing GBS sensitive to CTX    Patient presented on 8/11/2023 to the ED for growing pain that was 10 out of 10. Patient has a history of recurrent inguinal lymphadenopathy and has been followed up with urology, hematology oncology. He has had extensive work-up with PET scan multiple blood tests colonoscopy cystoscopy. Yesterday urology and oncology were notified and did not recommend any further intervention at this time and was to follow-up with his PCP for outpatient lymph node biopsy. Pt at the time did NOT meet SIRS criteria pt only had leukocytosis of 18.5 he received ceftriaxone and Toradol in the ED. Was discharged with Doxy. Bld work today unremarkable  LA and procal negative  NOT meeting SIRS criteria  Repeat BC NGTD @ 24h  continue CTX day 2  GC/chlamydia pending    Nonintractable episodic headache  Assessment & Plan  Pt with a history of headaches that are throbbing located in the posterior cerebrum cerebellar area intense severe to the point of tears occurring while working out no auras.  Pt works out at the same time daily drinks 1 gallon of H2O daily improving with cardio prior to lifting and deep breaths     Believe that these maybe cluster HA may benefit from propranolol for maintenance/prophylactic and triptan during occurrence    Recommending neuro referral on discharge and keeping a journal of HA occurances    Inguinal lymphadenopathy  Assessment & Plan  See mgx above  Consult h/o  Consult IR for inguinal bx  Npo at midnight on Monday    Could this be immunological? Or hormonal?    Referral to immunology and endocrinology on discharge  Also recommending journal account of LND           VTE Pharmacologic Prophylaxis: VTE Score: 2 Low Risk (Score 0-2) - Encourage Ambulation. Patient Centered Rounds: I performed bedside rounds with nursing staff today. Discussions with Specialists or Other Care Team Provider: IR, H/O, CAROL    Education and Discussions with Family / Patient: Updated  () via phone. Total Time Spent on Date of Encounter in care of patient: 35 minutes This time was spent on one or more of the following: performing physical exam; counseling and coordination of care; obtaining or reviewing history; documenting in the medical record; reviewing/ordering tests, medications or procedures; communicating with other healthcare professionals and discussing with patient's family/caregivers. Current Length of Stay: 2 day(s)  Current Patient Status: Inpatient   Certification Statement: The patient will continue to require additional inpatient hospital stay due to [facility]  Discharge Plan: Anticipate discharge tomorrow to home. Code Status: Level 1 - Full Code    Subjective:   Ysabel Poe was seen and examined at bedside. No acute events overnight. Discussed plan of care. All questions and concerns were answered and addressed. Has no acute complaints at this time. Objective:     Vitals:   Temp (24hrs), Av.2 °F (36.2 °C), Min:97.2 °F (36.2 °C), Max:97.3 °F (36.3 °C)    Temp:  [97.2 °F (36.2 °C)-97.3 °F (36.3 °C)] 97.2 °F (36.2 °C)  HR:  [56-89] 56  Resp:  [14-16] 14  BP: (113-135)/(59-79) 113/59  SpO2:  [97 %-99 %] 99 %  Body mass index is 27.44 kg/m². Input and Output Summary (last 24 hours): Intake/Output Summary (Last 24 hours) at 2023 1021  Last data filed at 2023 1839  Gross per 24 hour   Intake 360 ml   Output --   Net 360 ml       Physical Exam:   Physical Exam   Vitals and nursing note reviewed. Constitutional:       General: He is not in acute distress.     Appearance: Normal appearance. He is not ill-appearing.    HENT:      Head: Normocephalic and atraumatic. Cardiovascular:      Rate and Rhythm: Normal rate and regular rhythm.      Pulses: Normal pulses.      Heart sounds: Normal heart sounds. Pulmonary:      Effort: Pulmonary effort is normal.      Breath sounds: Normal breath sounds. Abdominal:      General: Abdomen is flat. Bowel sounds are normal.      Palpations: Abdomen is soft. Musculoskeletal:      Right lower leg: No edema.      Left lower leg: No edema. Skin:     General: Skin is warm. Neurological:      General: No focal deficit present.      Mental Status: He is alert and oriented to person, place, and time.     Additional Data:     Labs:  Results from last 7 days   Lab Units 08/14/23  0614   WBC Thousand/uL 7.37   HEMOGLOBIN g/dL 14.5   HEMATOCRIT % 42.0   PLATELETS Thousands/uL 261   NEUTROS PCT % 45   LYMPHS PCT % 42   MONOS PCT % 9   EOS PCT % 3     Results from last 7 days   Lab Units 08/14/23  0614 08/13/23  0454   SODIUM mmol/L 140 142   POTASSIUM mmol/L 4.0 4.1   CHLORIDE mmol/L 110* 111*   CO2 mmol/L 25 27   BUN mg/dL 16 13   CREATININE mg/dL 0.82 0.91   ANION GAP mmol/L 5 4   CALCIUM mg/dL 8.9 8.9   ALBUMIN g/dL  --  4.0   TOTAL BILIRUBIN mg/dL  --  0.50   ALK PHOS U/L  --  54   ALT U/L  --  13   AST U/L  --  15   GLUCOSE RANDOM mg/dL 101 107     Results from last 7 days   Lab Units 08/12/23  1057   INR  0.99             Results from last 7 days   Lab Units 08/12/23  1057 08/11/23  0752   LACTIC ACID mmol/L 1.5 0.7   PROCALCITONIN ng/ml 0.19 <0.05       Lines/Drains:  Invasive Devices     Peripheral Intravenous Line  Duration           Peripheral IV 08/12/23 Right Antecubital 2 days                      Imaging: No pertinent imaging reviewed. Recent Cultures (last 7 days):   Results from last 7 days   Lab Units 08/12/23  1058 08/12/23  1057 08/11/23  0806 08/11/23  0752   BLOOD CULTURE  No Growth at 24 hrs. No Growth at 24 hrs. No Growth at 48 hrs.  Streptococcus agalactiae (Group B)*   GRAM STAIN RESULT --   --   --  Gram positive cocci in chains*       Last 24 Hours Medication List:   Current Facility-Administered Medications   Medication Dose Route Frequency Provider Last Rate   • acetaminophen  650 mg Oral Q6H PRN Colby Maxwell MD     • aluminum-magnesium hydroxide-simethicone  30 mL Oral Q6H PRN Colby Maxwell MD     • cefTRIAXone  1,000 mg Intravenous Q24H Colby Maxwell MD 1,000 mg (08/13/23 1050)   • ketorolac  15 mg Intravenous Q6H PRN Colby Maxwell MD     • melatonin  6 mg Oral HS Charmaine Adams PA-C     • ondansetron  4 mg Intravenous Q4H PRN Colby Maxwell MD     • polyethylene glycol  17 g Oral Daily PRN Colby Maxwell MD          Today, Patient Was Seen By: Colby Maxwell MD    **Please Note: This note may have been constructed using a voice recognition system. **

## 2023-08-14 NOTE — ASSESSMENT & PLAN NOTE
Patient presenting today due to abnormal blood cultures  1/2 growing GBS sensitive to CTX    Patient presented on 8/11/2023 to the ED for growing pain that was 10 out of 10. Patient has a history of recurrent inguinal lymphadenopathy and has been followed up with urology, hematology oncology. He has had extensive work-up with PET scan multiple blood tests colonoscopy cystoscopy. Yesterday urology and oncology were notified and did not recommend any further intervention at this time and was to follow-up with his PCP for outpatient lymph node biopsy. Pt at the time did NOT meet SIRS criteria pt only had leukocytosis of 18.5 he received ceftriaxone and Toradol in the ED. Was discharged with Doxy.     Bld work today unremarkable  LA and procal negative  NOT meeting SIRS criteria  Repeat BC NGTD @ 48h-> most likely contaminent  continue CTX day 3 dc'd  GC/chlamydia negative

## 2023-08-14 NOTE — PLAN OF CARE
Problem: PAIN - ADULT  Goal: Verbalizes/displays adequate comfort level or baseline comfort level  Description: Interventions:  - Encourage patient to monitor pain and request assistance  - Assess pain using appropriate pain scale  - Administer analgesics based on type and severity of pain and evaluate response  - Implement non-pharmacological measures as appropriate and evaluate response  - Consider cultural and social influences on pain and pain management  - Notify physician/advanced practitioner if interventions unsuccessful or patient reports new pain  Outcome: Progressing     Problem: INFECTION - ADULT  Goal: Absence or prevention of progression during hospitalization  Description: INTERVENTIONS:  - Assess and monitor for signs and symptoms of infection  - Monitor lab/diagnostic results  - Monitor all insertion sites, i.e. indwelling lines, tubes, and drains  - Monitor endotracheal if appropriate and nasal secretions for changes in amount and color  - [place] appropriate cooling/warming therapies per order  - Administer medications as ordered  - Instruct and encourage patient and family to use good hand hygiene technique  - Identify and instruct in appropriate isolation precautions for identified infection/condition  Outcome: Progressing  Goal: Absence of fever/infection during neutropenic period  Description: INTERVENTIONS:  - Monitor WBC    Outcome: Progressing     Problem: SAFETY ADULT  Goal: Patient will remain free of falls  Description: INTERVENTIONS:  - Educate patient/family on patient safety including physical limitations  - Instruct patient to call for assistance with activity   - Consult OT/PT to assist with strengthening/mobility   - Keep Call bell within reach  - Keep bed low and locked with side rails adjusted as appropriate  - Keep care items and personal belongings within reach  - Initiate and maintain comfort rounds  - Make Fall Risk Sign visible to staff  - Offer Toileting every 2 Hours, in advance of need  - Initiate/Maintain alarm  - Obtain necessary fall risk management equipment:   - Apply yellow socks and bracelet for high fall risk patients  - Consider moving patient to room near nurses station  Outcome: Progressing  Goal: Maintain or return to baseline ADL function  Description: INTERVENTIONS:  -  Assess patient's ability to carry out ADLs; assess patient's baseline for ADL function and identify physical deficits which impact ability to perform ADLs (bathing, care of mouth/teeth, toileting, grooming, dressing, etc.)  - Assess/evaluate cause of self-care deficits   - Assess range of motion  - Assess patient's mobility; develop plan if impaired  - Assess patient's need for assistive devices and provide as appropriate  - Encourage maximum independence but intervene and supervise when necessary  - Involve family in performance of ADLs  - Assess for home care needs following discharge   - Consider OT consult to assist with ADL evaluation and planning for discharge  - Provide patient education as appropriate  Outcome: Progressing  Goal: Maintains/Returns to pre admission functional level  Description: INTERVENTIONS:  - Perform BMAT or MOVE assessment daily.   - Set and communicate daily mobility goal to care team and patient/family/caregiver. - Collaborate with rehabilitation services on mobility goals if consulted  - Perform Range of Motion 2 times a day. - Reposition patient every 2 hours.   - Dangle patient 2 times a day  - Stand patient 2 times a day  - Ambulate patient 2 times a day  - Out of bed to chair 2 times a day   - Out of bed for meals 2 times a day  - Out of bed for toileting  - Record patient progress and toleration of activity level   Outcome: Progressing     Problem: DISCHARGE PLANNING  Goal: Discharge to home or other facility with appropriate resources  Description: INTERVENTIONS:  - Identify barriers to discharge w/patient and caregiver  - Arrange for needed discharge resources and transportation as appropriate  - Identify discharge learning needs (meds, wound care, etc.)  - Arrange for interpretive services to assist at discharge as needed  - Refer to Case Management Department for coordinating discharge planning if the patient needs post-hospital services based on physician/advanced practitioner order or complex needs related to functional status, cognitive ability, or social support system  Outcome: Progressing     Problem: Knowledge Deficit  Goal: Patient/family/caregiver demonstrates understanding of disease process, treatment plan, medications, and discharge instructions  Description: Complete learning assessment and assess knowledge base.   Interventions:  - Provide teaching at level of understanding  - Provide teaching via preferred learning methods  Outcome: Progressing

## 2023-08-14 NOTE — CONSULTS
Medical Oncology/Hematology Consult Note  Valeria Garcia, male, 19 y.o., 2005,  /-[birth year], [id number]     Reason for consultation: Inguinal lymphadenopathy    Assessment and Plan:  1. Inguinal lymphadenopathy  · Patient has a 3-year history of inguinal lymphadenopathy with pain  · Had extensive work-up by our office last year. · PET/CT, 7/11/2022, showed prominent retroperitoneal and mesenteric nodes that did not demonstrate significant reactive, low-grade lymphoproliferative disorder was not excluded, recommended a follow-up CT in 3 months. · In addition, flow cytometry was negative, BCR/ABL was negative as well; LDH, uric acid WNL. Tumor markers were negative  · Patient was referred back to his PCP  · Patient also had a cystoscopy, colonoscopy and EGD, which were unremarkable. · Patient presented, 8/11/2020, enlarged lymph nodes. Patient reported pain 10 out of 10. States that he gets acute flares for which he takes tramadol. · Patient was noted to have elevated WBC 18.5, ANC 16.11, Hgb 15.1, MCV 85, Platelets [id number]  · HIV Panel/RPR/Chlamydia negative  · Blood cultures were noted to be positive, patient presented back to the hospital to    start IV Abx. · CT Imaging 8/11/23:  · No splenomegaly or lymphadenopathy by size criteria. ·  Stable prominent but nonenlarged retroperitoneal lymph node since 1/18/2022. · Ultrasound scrotum and testicles 8/11/23:  · Unremarkable study. · Today, patient feeling well overall. Denies Patient denies increased fatigue, fevers, frequent infections, drenching night sweats, unintentional weight loss, decreased appetite. He does not smoke or use illicit drugs. Patient reports he has not been exposed to HIV, hepatitis, EBV. · He works with [employer] and does heavy lifting, he also lifts weights. Reports he did not do anything out of his ordinary when he started experiencing this pain.   · On exam, mild inguinal lymphadenopathy noted, with patient reporting mild tenderness. Patient's RN, Nathaly Agosto, present during exam.   · WBC 7.37, ANC 3.39, Hgb 14.5, MCV 87, Platelets [id number], Uric acid 5.8, LDH pending    Recommendations:  · Recommend Surgical Oncology consult for possible excisional biopsy    Discussion:  Patient voiced frustration due to multiple flares the past three years and has not had an answer. Discussed in order to get a diagnosis, we would need a tissue biopsy. We would recommend an excisional biopsy therefore, surgical oncology is consulted. Patient in agreement with plan. Informed patient, his sister and his mother (who was on the phone) that it takes about 7-10 days for us to get the results from the biopsy, therefore, he will be scheduled to see one of our Medical Oncologists two weeks after the procedure. Patient agreeable. Outpatient follow up plan: to be set up by inpatient Cancer Coordinator, [facility]      Thank you for this consult. ZACHARIAH Barakat  Hematology-Oncology       History of present illness:  Howard Quintero is a 19 y.o. male with a PMH of inguinal lymphadenopathy, who presented to the ED on 8/11 for increased groin pain. Patient noted to have positive blood cultures the next day. Extensive work-up in 2022 which was negative. Review of Systems:   Review of Systems   Constitutional: Negative. Negative for fatigue and fever. HENT: Negative. Eyes: Negative. Respiratory: Negative for chest tightness and shortness of breath. Cardiovascular: Negative for chest pain and leg swelling. Gastrointestinal: Negative for abdominal pain, blood in stool and diarrhea. Endocrine: Negative. Genitourinary: Negative for hematuria. Inguinal tenderness   Musculoskeletal: Negative. Skin: Negative. Allergic/Immunologic: Negative. Neurological: Negative. Psychiatric/Behavioral: Negative. Oncology History:   Cancer Staging   No matching staging information was found for the patient.   Oncology History    No history exists. Past Medical History:   History reviewed. No pertinent past medical history. History reviewed. No pertinent surgical history. History reviewed. No pertinent family history.     Social History     Socioeconomic History   • Marital status: Single     Spouse name: None   • Number of children: None   • Years of education: None   • Highest education level: None   Occupational History   • None   Tobacco Use   • Smoking status: Never   • Smokeless tobacco: Never   Vaping Use   • Vaping Use: Never used   Substance and Sexual Activity   • Alcohol use: Not Currently   • Drug use: Not Currently   • Sexual activity: None   Other Topics Concern   • None   Social History Narrative   • None     Social Determinants of Health     Financial Resource Strain: Not on file   Food Insecurity: Not on file   Transportation Needs: Not on file   Physical Activity: Not on file   Stress: Not on file   Social Connections: Not on file   Intimate Partner Violence: Not on file   Housing Stability: Not on file         Current Facility-Administered Medications:   •  acetaminophen (TYLENOL) tablet 650 mg, 650 mg, Oral, Q6H PRN, Osmin Reed MD  •  aluminum-magnesium hydroxide-simethicone (MAALOX) oral suspension 30 mL, 30 mL, Oral, Q6H PRN, Osmin Reed MD  •  cefTRIAXone (ROCEPHIN) IVPB (premix in dextrose) 1,000 mg 50 mL, 1,000 mg, Intravenous, Q24H, Osmin Reed MD, Last Rate: 100 mL/hr at 08/14/23 1030, 1,000 mg at 08/14/23 1030  •  melatonin tablet 6 mg, 6 mg, Oral, HS, Merdis Meline, PA-C, 6 mg at 08/13/23 2207  •  ondansetron (ZOFRAN) injection 4 mg, 4 mg, Intravenous, Q4H PRN, Osmin Reed MD  •  polyethylene glycol (MIRALAX) packet 17 g, 17 g, Oral, Daily PRN, Osmin Reed MD    Medications Prior to Admission   Medication   • doxycycline hyclate (VIBRAMYCIN) 100 mg capsule   • ketorolac (TORADOL) 10 mg tablet   • saccharomyces boulardii (FLORASTOR) 250 mg capsule       No Known Allergies      Physical Exam:     /95   Pulse 61   Temp (!) 97.2 °F (36.2 °C) (Temporal)   Resp 14   Ht 5' 6" (1.676 m)   Wt 77.1 kg (170 lb)   SpO2 98%   BMI 27.44 kg/m²     Physical Exam  HENT:      Head: Normocephalic. Cardiovascular:      Rate and Rhythm: Normal rate. Pulses: Normal pulses. Heart sounds: Normal heart sounds. Pulmonary:      Effort: Pulmonary effort is normal.      Breath sounds: Normal breath sounds. Abdominal:      General: Abdomen is flat. Genitourinary:     Comments: Mild inguinal lymphadenopathy and mild tenderness. Musculoskeletal:      Cervical back: Normal range of motion. Right lower leg: No edema. Left lower leg: No edema. Skin:     General: Skin is warm. Neurological:      Mental Status: He is alert and oriented to person, place, and time. Psychiatric:         Mood and Affect: Mood normal.         Behavior: Behavior normal.         Thought Content:  Thought content normal.         Judgment: Judgment normal.           Recent Results (from the past 48 hour(s))   Comprehensive metabolic panel    Collection Time: 08/13/23  4:54 AM   Result Value Ref Range    Sodium 142 135 - 147 mmol/L    Potassium 4.1 3.5 - 5.3 mmol/L    Chloride 111 (H) 96 - 108 mmol/L    CO2 27 21 - 32 mmol/L    ANION GAP 4 mmol/L    BUN 13 5 - 25 mg/dL    Creatinine 0.91 0.60 - 1.30 mg/dL    Glucose 107 65 - 140 mg/dL    Calcium 8.9 8.4 - 10.2 mg/dL    AST 15 13 - 39 U/L    ALT 13 7 - 52 U/L    Alkaline Phosphatase 54 34 - 104 U/L    Total Protein 6.5 6.4 - 8.4 g/dL    Albumin 4.0 3.5 - 5.0 g/dL    Total Bilirubin 0.50 0.20 - 1.00 mg/dL    eGFR 120 ml/min/1.73sq m   Magnesium    Collection Time: 08/13/23  4:54 AM   Result Value Ref Range    Magnesium 2.0 1.9 - 2.7 mg/dL   CBC (With Platelets)    Collection Time: 08/13/23  4:54 AM   Result Value Ref Range    WBC 7.48 4.31 - 10.16 Thousand/uL    RBC 4.75 3.88 - 5.62 Million/uL    Hemoglobin 14.3 12.0 - 17.0 g/dL Hematocrit 40.9 36.5 - 49.3 %    MCV 86 82 - 98 fL    MCH 30.1 26.8 - 34.3 pg    MCHC 35.0 31.4 - 37.4 g/dL    RDW 11.9 11.6 - 15.1 %    Platelets 265 112 - 192 Thousands/uL    MPV 10.6 8.9 - 12.7 fL   CBC and differential    Collection Time: 08/14/23  6:14 AM   Result Value Ref Range    WBC 7.37 4.31 - 10.16 Thousand/uL    RBC 4.82 3.88 - 5.62 Million/uL    Hemoglobin 14.5 12.0 - 17.0 g/dL    Hematocrit 42.0 36.5 - 49.3 %    MCV 87 82 - 98 fL    MCH 30.1 26.8 - 34.3 pg    MCHC 34.5 31.4 - 37.4 g/dL    RDW 11.9 11.6 - 15.1 %    MPV 10.7 8.9 - 12.7 fL    Platelets 612 957 - 948 Thousands/uL    nRBC 0 /100 WBCs    Neutrophils Relative 45 43 - 75 %    Immat GRANS % 0 0 - 2 %    Lymphocytes Relative 42 14 - 44 %    Monocytes Relative 9 4 - 12 %    Eosinophils Relative 3 0 - 6 %    Basophils Relative 1 0 - 1 %    Neutrophils Absolute 3.39 1.85 - 7.62 Thousands/µL    Immature Grans Absolute 0.03 0.00 - 0.20 Thousand/uL    Lymphocytes Absolute 3.06 0.60 - 4.47 Thousands/µL    Monocytes Absolute 0.63 0.17 - 1.22 Thousand/µL    Eosinophils Absolute 0.21 0.00 - 0.61 Thousand/µL    Basophils Absolute 0.05 0.00 - 0.10 Thousands/µL   Basic metabolic panel    Collection Time: 08/14/23  6:14 AM   Result Value Ref Range    Sodium 140 135 - 147 mmol/L    Potassium 4.0 3.5 - 5.3 mmol/L    Chloride 110 (H) 96 - 108 mmol/L    CO2 25 21 - 32 mmol/L    ANION GAP 5 mmol/L    BUN 16 5 - 25 mg/dL    Creatinine 0.82 0.60 - 1.30 mg/dL    Glucose 101 65 - 140 mg/dL    Calcium 8.9 8.4 - 10.2 mg/dL    eGFR 127 ml/min/1.73sq m       US scrotum and testicles    Result Date: 8/11/2023  Narrative: SCROTAL ULTRASOUND INDICATION:    Scrotal pain. COMPARISON: 5/2/2022 TECHNIQUE:   Ultrasound the scrotal contents was performed with a high frequency linear transducer utilizing volumetric sweep imaging as well as standard still image techniques. Imaging performed in longitudinal and transverse orientation.  Color and spectral Doppler evaluation also performed bilaterally. FINDINGS: TESTES: Testes are symmetric and normal in size. RIGHT testis = 5.1 x 2.1 x 4.2 cm. Volume 24.0 mL Normal contour with homogeneous smooth echotexture. No intratesticular mass lesion or calcifications. LEFT testis = 4.8 x 2.2 x 3.3 cm. Volume 17.9 mL Normal contour with homogeneous smooth echotexture. No intratesticular mass lesion or calcifications. Doppler flow within both testes is present and symmetric. EPIDIDYMIDES: Normal Size. Doppler ultrasound demonstrates normal blood flow. No epididymal lesions. HYDROCELE:  No significant fluid present. VARICOCELE:  None present. SCROTUM:  Scrotal thickness and appearance within normal limits. No evidence for extratesticular mass or hernia demonstrated. Impression: Unremarkable study. Workstation performed: [id number]     CT chest abdomen pelvis w contrast    Result Date: 8/11/2023  Narrative: CT CHEST, ABDOMEN AND PELVIS WITH IV CONTRAST INDICATION:   Adenopathy. COMPARISON: PET/CT 7/11/2022. TECHNIQUE: CT examination of the chest, abdomen and pelvis was performed. Multiplanar 2D reformatted images were created from the source data. This examination, like all CT scans performed in the [facility], was performed utilizing techniques to minimize radiation dose exposure, including the use of iterative reconstruction and automated exposure control. Radiation dose length product (DLP) for this visit:  745 mGy-cm IV Contrast:  100 mL of iohexol (OMNIPAQUE) Enteric Contrast: Enteric contrast was not administered. FINDINGS: CHEST LUNGS:  Lungs are clear. There is no tracheal or endobronchial lesion. No suspicious pulmonary nodule. PLEURA:  Unremarkable. HEART/GREAT VESSELS: Heart is unremarkable for patient's age. No thoracic aortic aneurysm. MEDIASTINUM AND YANIV: No mediastinal or hilar lymphadenopathy. CHEST WALL AND LOWER NECK: No axillary or supraclavicular lymphadenopathy identified.  ABDOMEN LIVER/BILIARY TREE: Unremarkable. GALLBLADDER:  No calcified gallstones. No pericholecystic inflammatory change. SPLEEN:  Unremarkable. Specifically, no splenomegaly. PANCREAS:  Unremarkable. ADRENAL GLANDS:  Unremarkable. KIDNEYS/URETERS:  Unremarkable. No hydronephrosis. STOMACH AND BOWEL:  Unremarkable. APPENDIX:  No findings to suggest appendicitis. ABDOMINOPELVIC CAVITY:  No ascites. No pneumoperitoneum. No lymphadenopathy by size criteria. Stable prominent but nonenlarged retroperitoneal lymph nodes since 1/18/2022, for example, left para-aortic lymph node measuring 0.8 cm in short axis (2/159),  unchanged since 1/18/2022. VESSELS: No abdominal aortic aneurysm. Common origin of the celiac artery and the superior mesenteric artery off of the abdominal aorta. PELVIS REPRODUCTIVE ORGANS:  Unremarkable for patient's age. URINARY BLADDER:  Unremarkable. ABDOMINAL WALL/INGUINAL REGIONS: Symmetric nonenlarged inguinal lymph nodes bilaterally. No inguinal lymphadenopathy by size criteria. OSSEOUS STRUCTURES:  No acute fracture or destructive osseous lesion. Impression: No splenomegaly or lymphadenopathy by size criteria. Stable prominent but nonenlarged retroperitoneal lymph node since 1/18/2022. Workstation performed: [id number]       Labs and pertinent reports reviewed. This note has been generated by voice recognition software system. Therefore, there may be spelling, grammar, and or syntax errors. Please contact if questions arise.

## 2023-08-15 ENCOUNTER — PREP FOR PROCEDURE (OUTPATIENT)
Dept: SURGICAL ONCOLOGY | Facility: CLINIC | Age: 18
End: 2023-08-15

## 2023-08-15 ENCOUNTER — TELEPHONE (OUTPATIENT)
Dept: SURGICAL ONCOLOGY | Facility: CLINIC | Age: 18
End: 2023-08-15

## 2023-08-15 VITALS
WEIGHT: 170 LBS | RESPIRATION RATE: 14 BRPM | DIASTOLIC BLOOD PRESSURE: 50 MMHG | TEMPERATURE: 97.7 F | OXYGEN SATURATION: 98 % | BODY MASS INDEX: 27.32 KG/M2 | HEART RATE: 74 BPM | SYSTOLIC BLOOD PRESSURE: 130 MMHG | HEIGHT: 66 IN

## 2023-08-15 DIAGNOSIS — R59.0 INGUINAL LYMPHADENOPATHY: Primary | ICD-10-CM

## 2023-08-15 PROCEDURE — 99239 HOSP IP/OBS DSCHRG MGMT >30: CPT | Performed by: STUDENT IN AN ORGANIZED HEALTH CARE EDUCATION/TRAINING PROGRAM

## 2023-08-15 RX ORDER — OXYCODONE HYDROCHLORIDE 5 MG/1
5 TABLET ORAL EVERY 4 HOURS PRN
Qty: 10 TABLET | Refills: 0 | Status: CANCELLED | OUTPATIENT
Start: 2023-08-15

## 2023-08-15 RX ORDER — CEFAZOLIN SODIUM 1 G/50ML
1000 SOLUTION INTRAVENOUS ONCE
Status: CANCELLED | OUTPATIENT
Start: 2023-08-15 | End: 2023-08-15

## 2023-08-15 NOTE — PLAN OF CARE
Problem: PAIN - ADULT  Goal: Verbalizes/displays adequate comfort level or baseline comfort level  Description: Interventions:  - Encourage patient to monitor pain and request assistance  - Assess pain using appropriate pain scale  - Administer analgesics based on type and severity of pain and evaluate response  - Implement non-pharmacological measures as appropriate and evaluate response  - Consider cultural and social influences on pain and pain management  - Notify physician/advanced practitioner if interventions unsuccessful or patient reports new pain  Outcome: Progressing     Problem: INFECTION - ADULT  Goal: Absence or prevention of progression during hospitalization  Description: INTERVENTIONS:  - Assess and monitor for signs and symptoms of infection  - Monitor lab/diagnostic results  - Monitor all insertion sites, i.e. indwelling lines, tubes, and drains  - Monitor endotracheal if appropriate and nasal secretions for changes in amount and color  - [place] appropriate cooling/warming therapies per order  - Administer medications as ordered  - Instruct and encourage patient and family to use good hand hygiene technique  - Identify and instruct in appropriate isolation precautions for identified infection/condition  Outcome: Progressing  Goal: Absence of fever/infection during neutropenic period  Description: INTERVENTIONS:  - Monitor WBC    Outcome: Progressing     Problem: SAFETY ADULT  Goal: Patient will remain free of falls  Description: INTERVENTIONS:  - Educate patient/family on patient safety including physical limitations  - Instruct patient to call for assistance with activity   - Consult OT/PT to assist with strengthening/mobility   - Keep Call bell within reach  - Keep bed low and locked with side rails adjusted as appropriate  - Keep care items and personal belongings within reach  - Initiate and maintain comfort rounds  - Make Fall Risk Sign visible to staff  - Offer Toileting every 2 Hours, in advance of need  - Initiate/Maintain alarm  - Obtain necessary fall risk management equipment:   - Apply yellow socks and bracelet for high fall risk patients  - Consider moving patient to room near nurses station  Outcome: Progressing  Goal: Maintain or return to baseline ADL function  Description: INTERVENTIONS:  -  Assess patient's ability to carry out ADLs; assess patient's baseline for ADL function and identify physical deficits which impact ability to perform ADLs (bathing, care of mouth/teeth, toileting, grooming, dressing, etc.)  - Assess/evaluate cause of self-care deficits   - Assess range of motion  - Assess patient's mobility; develop plan if impaired  - Assess patient's need for assistive devices and provide as appropriate  - Encourage maximum independence but intervene and supervise when necessary  - Involve family in performance of ADLs  - Assess for home care needs following discharge   - Consider OT consult to assist with ADL evaluation and planning for discharge  - Provide patient education as appropriate  Outcome: Progressing  Goal: Maintains/Returns to pre admission functional level  Description: INTERVENTIONS:  - Perform BMAT or MOVE assessment daily.   - Set and communicate daily mobility goal to care team and patient/family/caregiver. - Collaborate with rehabilitation services on mobility goals if consulted  - Perform Range of Motion 2 times a day. - Reposition patient every 2 hours.   - Dangle patient 2 times a day  - Stand patient 2 times a day  - Ambulate patient 2 times a day  - Out of bed to chair 2 times a day   - Out of bed for meals 2 times a day  - Out of bed for toileting  - Record patient progress and toleration of activity level   Outcome: Progressing     Problem: DISCHARGE PLANNING  Goal: Discharge to home or other facility with appropriate resources  Description: INTERVENTIONS:  - Identify barriers to discharge w/patient and caregiver  - Arrange for needed discharge resources and transportation as appropriate  - Identify discharge learning needs (meds, wound care, etc.)  - Arrange for interpretive services to assist at discharge as needed  - Refer to Case Management Department for coordinating discharge planning if the patient needs post-hospital services based on physician/advanced practitioner order or complex needs related to functional status, cognitive ability, or social support system  Outcome: Progressing     Problem: Knowledge Deficit  Goal: Patient/family/caregiver demonstrates understanding of disease process, treatment plan, medications, and discharge instructions  Description: Complete learning assessment and assess knowledge base.   Interventions:  - Provide teaching at level of understanding  - Provide teaching via preferred learning methods  Outcome: Progressing

## 2023-08-15 NOTE — TELEPHONE ENCOUNTER
Per Dr. Raj Zhong cystoscopy note:  Plan:     Patient has now completed a thorough genitourinary evaluation including physical exam, imaging and cystoscopy. Thankfully no signs of primary genitourinary process. We will defer to his other physicians regarding management of his symptomatic intermittent inguinal lymphadenopathy.

## 2023-08-15 NOTE — DISCHARGE SUMMARY
[address] [facility]  Discharge- Jayla Ward 2005, 19 y.o. male MRN: [id number]  Unit/Bed#: -Shailesh Encounter: [id number]  Primary Care Provider: Michiel Gilford   Date and time admitted to hospital: 8/12/2023 10:43 AM    * Positive blood culture  Assessment & Plan  Patient presenting today due to abnormal blood cultures  1/2 growing GBS sensitive to CTX    Patient presented on 8/11/2023 to the ED for growing pain that was 10 out of 10. Patient has a history of recurrent inguinal lymphadenopathy and has been followed up with urology, hematology oncology. He has had extensive work-up with PET scan multiple blood tests colonoscopy cystoscopy. Yesterday urology and oncology were notified and did not recommend any further intervention at this time and was to follow-up with his PCP for outpatient lymph node biopsy. Pt at the time did NOT meet SIRS criteria pt only had leukocytosis of 18.5 he received ceftriaxone and Toradol in the ED. Was discharged with Doxy. Bld work today unremarkable  LA and procal negative  NOT meeting SIRS criteria  Repeat BC NGTD @ 48h-> most likely contaminent  continue CTX day 3 dc'd  GC/chlamydia negative    Nonintractable episodic headache  Assessment & Plan  Pt with a history of headaches that are throbbing located in the posterior cerebrum cerebellar area intense severe to the point of tears occurring while working out no auras.  Pt works out at the same time daily drinks 1 gallon of H2O daily improving with cardio prior to lifting and deep breaths     Believe that these maybe cluster HA may benefit from propranolol for maintenance/prophylactic and triptan during occurrence    Recommending neuro referral on discharge and keeping a journal of HA occurances    Inguinal lymphadenopathy  Assessment & Plan  See mgx above  h/o recommending excisional lymph node biopsy  Surgical oncology will hopefully schedule surgery outpatient for excisional biopsy later this week    Could this be immunological? Or hormonal?    Referral to immunology and endocrinology on discharge  Also recommending journal account of LND      Medical Problems     Resolved Problems  Date Reviewed: 6/27/2022   None       Discharging Physician / Practitioner: Parish Nguyen MD  PCP: Odalys Castellanos  Admission Date:   Admission Orders (From admission, onward)     Ordered        08/12/23 1142  [address]  Once                      Discharge Date: 08/15/23    Consultations During Hospital Stay:  · H/O  · Surgical oncology  · IR  · CM  ·     Procedures Performed:   No orders to display   ·   ·     Significant Findings / Test Results:   · 1/2 BC growing GBS  · BC NGTD @ 48h    Incidental Findings:   ·  none  ·     Test Results Pending at Discharge (will require follow up):   · none     Outpatient Tests Requested:  · none    Complications:  None known at this time    Reason for Admission: [facility] Course:   Joss Sánchez is a 19 y.o. male patient who originally presented to the hospital on 8/12/2023 due to positive blood cultures. Patient presented to the ED due to significant groin pain and then was discharged. Blood cultures that were drawn during that admission to the ED grew 1 of 2 GBS. He was admitted and started on ceftriaxone and repeat blood cultures were drawn. Due to significant history of groin pain that no diagnosis has been found IR was consulted as well as hematology oncology. Initially to receive FNA incisional biopsy which was canceled as hematology oncology recommending excisional biopsy. At that time surgical oncology was consulted and recommending outpatient surgery. Blood culture showed no growth to date after 48 hours. IV antibiotics were discontinued. He is otherwise remained hemodynamically stable afebrile in no acute respiratory distress. He has been medically cleared for discharge. He is to follow-up with hematology oncology and surgical oncology.   He will be receiving a call to schedule for excisional biopsy sometime this week. Also recommending immunology referral as well as endocrine. Recommended that he journal all accounts of headaches as well as groin flares. Please see above list of diagnoses and related plan for additional information. Condition at Discharge: stable    Discharge Day Visit / Exam:   Subjective:  Niyah Lim was seen and examined at bedside. No acute events overnight. Discussed plan of care. All questions and concerns were answered and addressed. Has no acute complaints at this time. Vitals: Blood Pressure: 131/73 (08/14/23 2124)  Pulse: 68 (08/14/23 2124)  Temperature: 97.9 °F (36.6 °C) (08/14/23 2124)  Temp Source: Temporal (08/14/23 1447)  Respirations: 14 (08/13/23 2207)  Height: 5' 6" (167.6 cm) (08/12/23 1420)  Weight - Scale: 77.1 kg (170 lb) (08/12/23 1420)  SpO2: 97 % (08/14/23 2124)  Exam:   Physical Exam   Vitals and nursing note reviewed. Constitutional:       General: He is not in acute distress.     Appearance: Normal appearance. He is not ill-appearing. HENT:      Head: Normocephalic and atraumatic. Cardiovascular:      Rate and Rhythm: Normal rate and regular rhythm.      Pulses: Normal pulses.      Heart sounds: Normal heart sounds. Pulmonary:      Effort: Pulmonary effort is normal.      Breath sounds: Normal breath sounds. Abdominal:      General: Abdomen is flat. Bowel sounds are normal.      Palpations: Abdomen is soft. Musculoskeletal:      Right lower leg: No edema.      Left lower leg: No edema. Skin:     General: Skin is warm. Neurological:      General: No focal deficit present.      Mental Status: He is alert and oriented to person, place, and time.       Discussion with Family: Updated  (father) via phone. Discharge instructions/Information to patient and family:   See after visit summary for information provided to patient and family.       Provisions for Follow-Up Care:  See after visit summary for information related to follow-up care and any pertinent home health orders. Disposition:   Home    Planned Readmission: no     Discharge Statement:  I spent 35 minutes discharging the patient. This time was spent on the day of discharge. I had direct contact with the patient on the day of discharge. Greater than 50% of the total time was spent examining patient, answering all patient questions, arranging and discussing plan of care with patient as well as directly providing post-discharge instructions. Additional time then spent on discharge activities. Discharge Medications:  See after visit summary for reconciled discharge medications provided to patient and/or family.       **Please Note: This note may have been constructed using a voice recognition system**

## 2023-08-15 NOTE — UTILIZATION REVIEW
NOTIFICATION OF ADMISSION DISCHARGE   This is a Notification of Discharge from [facility]. Please be advised that this patient has been discharge from our facility. Below you will find the admission and discharge date and time including the patient’s disposition. UTILIZATION REVIEW CONTACT:  Handy Orta  Utilization   Network Utilization Review Department  Phone: [phone] carefully listen to the prompts. All voicemails are confidential.  Email: [email or web address]     ADMISSION INFORMATION  PRESENTATION DATE: 8/12/2023 10:43 AM  OBERVATION ADMISSION DATE:   INPATIENT ADMISSION DATE: 8/12/23 11:42 AM   DISCHARGE DATE: 8/15/2023 10:20 AM   DISPOSITION:Home/Self Care    IMPORTANT INFORMATION:  Send all requests for admission clinical reviews, approved or denied determinations and any other requests to dedicated fax number below belonging to the campus where the patient is receiving treatment.  List of dedicated fax numbers:  [place] DENIALS (Administrative/Medical Necessity) [phone] [address] [facility] (Maternity/NICU/Pediatrics) [phone]   [facility] [phone]   [facility] [phone] [address] [facility] [phone]   [address] [place] [address] [phone]   [facility] [phone]   [address] [place] [address] [address] [facility] [phone]   [address] [place] [address] [phone]   [address] [facility] [phone] 27[address] [facility] [address] [facility] [phone]

## 2023-08-15 NOTE — TELEPHONE ENCOUNTER
Spoke to patient regarding the surgery with Dr Pati Simmons. Patient accepted date of 8/18, all surgery instructions given.

## 2023-08-15 NOTE — DISCHARGE INSTR - AVS FIRST PAGE
You are to follow-up with your PCP in 2 weeks    You are to follow-up with hematology oncology and surgical oncology they will be calling you to schedule an appointment for surgery.     Recommend to journal all events in regards to your headaches as well as groin pain    You will have a referral for immunology endocrinology and neurology

## 2023-08-16 ENCOUNTER — TELEPHONE (OUTPATIENT)
Dept: HEMATOLOGY ONCOLOGY | Facility: CLINIC | Age: 18
End: 2023-08-16

## 2023-08-16 LAB — BACTERIA BLD CULT: NORMAL

## 2023-08-16 NOTE — TELEPHONE ENCOUNTER
I phoned the patient and introduced myself and indicated that I was calling from [facility] regarding setting up a follow up appointment for review of his biopsy, which is planned for 8/18 with Dr. Amon Cogan. We were able to schedule an appointment with Dr. Hesham Choudhary on 9/6 at [phone] in the [facility] office. Day and I reviewed the office address and location, and I provided the [employer] number as the office contact. He indicated that this appointment would work for him, as he would be able to come after work. He was appreciative of the call and the information.

## 2023-08-17 ENCOUNTER — ANESTHESIA EVENT (OUTPATIENT)
Dept: PERIOP | Facility: HOSPITAL | Age: 18
End: 2023-08-17
Payer: COMMERCIAL

## 2023-08-17 LAB
BACTERIA BLD CULT: NORMAL
BACTERIA BLD CULT: NORMAL

## 2023-08-18 ENCOUNTER — HOSPITAL ENCOUNTER (OUTPATIENT)
Facility: HOSPITAL | Age: 18
Setting detail: OUTPATIENT SURGERY
Discharge: HOME | End: 2023-08-18
Attending: SURGERY | Admitting: SURGERY
Payer: COMMERCIAL

## 2023-08-18 ENCOUNTER — ANESTHESIA (OUTPATIENT)
Dept: PERIOP | Facility: HOSPITAL | Age: 18
End: 2023-08-18
Payer: COMMERCIAL

## 2023-08-18 VITALS
SYSTOLIC BLOOD PRESSURE: 117 MMHG | BODY MASS INDEX: 27.32 KG/M2 | OXYGEN SATURATION: 98 % | WEIGHT: 170 LBS | HEART RATE: 49 BPM | DIASTOLIC BLOOD PRESSURE: 59 MMHG | HEIGHT: 66 IN | TEMPERATURE: 97.6 F | RESPIRATION RATE: 14 BRPM

## 2023-08-18 DIAGNOSIS — R59.0 INGUINAL LYMPHADENOPATHY: Primary | ICD-10-CM

## 2023-08-18 DIAGNOSIS — R59.0 INGUINAL LYMPHADENOPATHY: ICD-10-CM

## 2023-08-18 PROCEDURE — 88184 FLOWCYTOMETRY/ TC 1 MARKER: CPT | Performed by: SURGERY

## 2023-08-18 PROCEDURE — 38531 OPEN BX/EXC INGUINOFEM NODES: CPT | Performed by: PHYSICIAN ASSISTANT

## 2023-08-18 PROCEDURE — 87070 CULTURE OTHR SPECIMN AEROBIC: CPT | Performed by: SURGERY

## 2023-08-18 PROCEDURE — 87252 VIRUS INOCULATION TISSUE: CPT | Performed by: SURGERY

## 2023-08-18 PROCEDURE — 87116 MYCOBACTERIA CULTURE: CPT | Performed by: SURGERY

## 2023-08-18 PROCEDURE — 88185 FLOWCYTOMETRY/TC ADD-ON: CPT

## 2023-08-18 PROCEDURE — 87205 SMEAR GRAM STAIN: CPT | Performed by: SURGERY

## 2023-08-18 PROCEDURE — 87075 CULTR BACTERIA EXCEPT BLOOD: CPT | Performed by: SURGERY

## 2023-08-18 PROCEDURE — 87206 SMEAR FLUORESCENT/ACID STAI: CPT | Performed by: SURGERY

## 2023-08-18 PROCEDURE — 87102 FUNGUS ISOLATION CULTURE: CPT | Performed by: SURGERY

## 2023-08-18 RX ORDER — HYDROMORPHONE HCL IN WATER/PF 6 MG/30 ML
0.2 PATIENT CONTROLLED ANALGESIA SYRINGE INTRAVENOUS
Status: DISCONTINUED | OUTPATIENT
Start: 2023-08-18 | End: 2023-08-18 | Stop reason: HOSPADM

## 2023-08-18 RX ORDER — FENTANYL CITRATE 50 UG/ML
INJECTION, SOLUTION INTRAMUSCULAR; INTRAVENOUS AS NEEDED
Status: DISCONTINUED | OUTPATIENT
Start: 2023-08-18 | End: 2023-08-18

## 2023-08-18 RX ORDER — MIDAZOLAM HYDROCHLORIDE 2 MG/2ML
INJECTION, SOLUTION INTRAMUSCULAR; INTRAVENOUS AS NEEDED
Status: DISCONTINUED | OUTPATIENT
Start: 2023-08-18 | End: 2023-08-18

## 2023-08-18 RX ORDER — CEFAZOLIN SODIUM 1 G/50ML
1000 SOLUTION INTRAVENOUS ONCE
Status: COMPLETED | OUTPATIENT
Start: 2023-08-18 | End: 2023-08-18

## 2023-08-18 RX ORDER — OXYCODONE HYDROCHLORIDE 5 MG/1
5 TABLET ORAL EVERY 4 HOURS PRN
Status: DISCONTINUED | OUTPATIENT
Start: 2023-08-18 | End: 2023-08-18 | Stop reason: HOSPADM

## 2023-08-18 RX ORDER — LIDOCAINE HYDROCHLORIDE 10 MG/ML
INJECTION, SOLUTION EPIDURAL; INFILTRATION; INTRACAUDAL; PERINEURAL AS NEEDED
Status: DISCONTINUED | OUTPATIENT
Start: 2023-08-18 | End: 2023-08-18

## 2023-08-18 RX ORDER — PROPOFOL 10 MG/ML
INJECTION, EMULSION INTRAVENOUS AS NEEDED
Status: DISCONTINUED | OUTPATIENT
Start: 2023-08-18 | End: 2023-08-18

## 2023-08-18 RX ORDER — OXYCODONE HYDROCHLORIDE 5 MG/1
5 TABLET ORAL EVERY 6 HOURS PRN
Qty: 10 TABLET | Refills: 0 | Status: SHIPPED | OUTPATIENT
Start: 2023-08-18

## 2023-08-18 RX ORDER — BUPIVACAINE HYDROCHLORIDE 2.5 MG/ML
INJECTION, SOLUTION EPIDURAL; INFILTRATION; INTRACAUDAL AS NEEDED
Status: DISCONTINUED | OUTPATIENT
Start: 2023-08-18 | End: 2023-08-18 | Stop reason: HOSPADM

## 2023-08-18 RX ORDER — ONDANSETRON 2 MG/ML
INJECTION INTRAMUSCULAR; INTRAVENOUS AS NEEDED
Status: DISCONTINUED | OUTPATIENT
Start: 2023-08-18 | End: 2023-08-18

## 2023-08-18 RX ORDER — OXYCODONE HYDROCHLORIDE 5 MG/1
2.5 TABLET ORAL EVERY 4 HOURS PRN
Status: DISCONTINUED | OUTPATIENT
Start: 2023-08-18 | End: 2023-08-18 | Stop reason: HOSPADM

## 2023-08-18 RX ORDER — ACETAMINOPHEN 325 MG/1
650 TABLET ORAL EVERY 4 HOURS PRN
Status: DISCONTINUED | OUTPATIENT
Start: 2023-08-18 | End: 2023-08-18 | Stop reason: HOSPADM

## 2023-08-18 RX ORDER — SODIUM CHLORIDE, SODIUM LACTATE, POTASSIUM CHLORIDE, CALCIUM CHLORIDE 600; 310; 30; 20 MG/100ML; MG/100ML; MG/100ML; MG/100ML
125 INJECTION, SOLUTION INTRAVENOUS CONTINUOUS
Status: DISCONTINUED | OUTPATIENT
Start: 2023-08-18 | End: 2023-08-18 | Stop reason: HOSPADM

## 2023-08-18 RX ORDER — DEXAMETHASONE SODIUM PHOSPHATE 10 MG/ML
INJECTION, SOLUTION INTRAMUSCULAR; INTRAVENOUS AS NEEDED
Status: DISCONTINUED | OUTPATIENT
Start: 2023-08-18 | End: 2023-08-18

## 2023-08-18 RX ADMIN — ONDANSETRON 4 MG: 2 INJECTION INTRAMUSCULAR; INTRAVENOUS at 10:28

## 2023-08-18 RX ADMIN — DEXAMETHASONE SODIUM PHOSPHATE 10 MG: 10 INJECTION, SOLUTION INTRAMUSCULAR; INTRAVENOUS at 10:11

## 2023-08-18 RX ADMIN — HYDROMORPHONE HYDROCHLORIDE 0.2 MG: 0.2 INJECTION, SOLUTION INTRAMUSCULAR; INTRAVENOUS; SUBCUTANEOUS at 10:54

## 2023-08-18 RX ADMIN — OXYCODONE HYDROCHLORIDE 2.5 MG: 5 TABLET ORAL at 12:28

## 2023-08-18 RX ADMIN — SODIUM CHLORIDE, SODIUM LACTATE, POTASSIUM CHLORIDE, AND CALCIUM CHLORIDE 125 ML/HR: .6; .31; .03; .02 INJECTION, SOLUTION INTRAVENOUS at 08:59

## 2023-08-18 RX ADMIN — MIDAZOLAM 2 MG: 1 INJECTION INTRAMUSCULAR; INTRAVENOUS at 09:48

## 2023-08-18 RX ADMIN — LIDOCAINE HYDROCHLORIDE 100 MG: 10 INJECTION, SOLUTION EPIDURAL; INFILTRATION; INTRACAUDAL; PERINEURAL at 09:59

## 2023-08-18 RX ADMIN — ACETAMINOPHEN 650 MG: 325 TABLET, FILM COATED ORAL at 15:21

## 2023-08-18 RX ADMIN — HYDROMORPHONE HYDROCHLORIDE 0.2 MG: 0.2 INJECTION, SOLUTION INTRAMUSCULAR; INTRAVENOUS; SUBCUTANEOUS at 11:14

## 2023-08-18 RX ADMIN — PROPOFOL 150 MG: 10 INJECTION, EMULSION INTRAVENOUS at 10:00

## 2023-08-18 RX ADMIN — CEFAZOLIN SODIUM 1000 MG: 1 SOLUTION INTRAVENOUS at 10:09

## 2023-08-18 RX ADMIN — FENTANYL CITRATE 100 MCG: 50 INJECTION, SOLUTION INTRAMUSCULAR; INTRAVENOUS at 09:57

## 2023-08-18 RX ADMIN — HYDROMORPHONE HYDROCHLORIDE 0.2 MG: 0.2 INJECTION, SOLUTION INTRAMUSCULAR; INTRAVENOUS; SUBCUTANEOUS at 11:04

## 2023-08-18 NOTE — ANESTHESIA PREPROCEDURE EVALUATION
Procedure:  EXCISION RIGHT INGUINAL NODE, LYMPHOMA PROTOCOL (Right: Groin)    Relevant Problems   ANESTHESIA (within normal limits)      CARDIO (within normal limits)      ENDO (within normal limits)      GI/HEPATIC   (+) Gastroesophageal reflux disease      NEURO/PSYCH   (+) Nonintractable episodic headache      PULMONARY (within normal limits)      Other   (+) Inguinal lymphadenopathy        Physical Exam    Airway    Mallampati score: I  TM Distance: >3 FB  Neck ROM: full     Dental   Comment: Braces, No notable dental hx     Cardiovascular  Cardiovascular exam normal    Pulmonary  Pulmonary exam normal     Other Findings        Anesthesia Plan  ASA Score- 1     Anesthesia Type- general with ASA Monitors. Additional Monitors:   Airway Plan: LMA. Plan Factors-Exercise tolerance (METS): >4 METS. Chart reviewed. EKG reviewed. Existing labs reviewed. Patient summary reviewed. Patient is not a current smoker. Induction- intravenous. Postoperative Plan-     Informed Consent- Anesthetic plan and risks discussed with patient and father.

## 2023-08-18 NOTE — DISCHARGE INSTR - AVS FIRST PAGE
POST-OPERATIVE WOUND CARE INSTRUCTIONS    Your wound is closed with:   Dissolvable sutures/glue       Wound care: You may shower using soap and water to clean your wound. Gently pat it dry. You may redress your wound for comfort as needed. Activity:   Did not perform any heavy lifting or strenuous physical activity for 7 days. Your activity restrictions will be reevaluated at your postoperative visit. Medications: You may resume all your preoperative medications and diet. Pain medication as directed on the prescription given in the office. Other:   May use ice on the wound for 24-48 hours as needed for comfort. May place warm compresses to the groin after 48 hours for comfort. Call the office at [phone] if you have any of the followin. Redness, swelling, heat, unusual drainage or heavy bleeding from the wound.    2. Fever greater than 101°F.   3. Pain not relieved by the prescribed pain medication

## 2023-08-18 NOTE — OP NOTE
OPERATIVE REPORT  PATIENT NAME: Sruthi Murray    :  2005  MRN: [id number]  Pt Location: [facility] OR ROOM 03    SURGERY DATE: 2023    Surgeon(s) and Role:     * Breann Padron MD - Primary     * Dayana Rodriguez PA-C - Assisting    Preop Diagnosis:  Inguinal lymphadenopathy [R59.0]    Post-Op Diagnosis Codes:     * Inguinal lymphadenopathy [R59.0]    Procedure(s):  Right - EXCISION RIGHT INGUINAL NODE. LYMPHOMA PROTOCOL    Specimen(s):  ID Type Source Tests Collected by Time Destination   1 : Right inguinal lymph node Tissue Soft Tissue, Other CULTURE, TISSUE AND GRAM STAIN, TISSUE EXAM Breann Padron MD 2023 10:16 AM        Estimated Blood Loss:   Minimal    Drains:  * No LDAs found *    Anesthesia Type:   General    Operative Indications:  Inguinal lymphadenopathy [R610]  28-year-old male with inguinal lymphadenopathy. It was recommended that he undergo excision of one of the enlarged lymph nodes. Risks and benefits were explained. Informed consent was obtained. Patient was brought to the operating room. Operative Findings:  Enlarged right inguinal lymph node. Complications:   None    Procedure and Technique:  After identifying the patient, general anesthesia was achieved. Patient was prepped and draped in the usual sterile fashion. A timeout was performed. An incision was made over the enlarged, palpable right inguinal lymph node. This was taken through the skin, subcutaneous tissue. The lymph node was identified and encircled with 2-0 Vicryl suture. A vein was dissected away from the lymph node. The lymphatics were now clamped, divided and ligated with 2-0 Vicryl suture. The lymph node was sent fresh to pathology for flow as well as culture. Wound was now copiously irrigated. Local anesthesia was infiltrated into the wound. There was excellent hemostasis.   The incision was approximated with interrupted 2-0 Vicryl suture subcutaneously and a running 4-0 Monocryl suture in aseptic fashion for the skin. Skin glue was used on the skin. Patient was extubated and taken to the recovery room in stable condition having tolerated the procedure well. I was present and participated in all aspects of this procedure. I was present for the entire procedure., A qualified resident physician was not available. and A physician assistant was required during the procedure for retraction, tissue handling, dissection and suturing.     Patient Disposition:  PACU     This procedure was not performed to treat cancer         SIGNATURE: Delmy Luke MD  DATE: August 18, 2023  TIME: 10:35 AM

## 2023-08-18 NOTE — ANESTHESIA POSTPROCEDURE EVALUATION
Post-Op Assessment Note    CV Status:  Stable  Pain Score: 0    Pain management: adequate     Mental Status:  Alert   Hydration Status:  Stable   PONV Controlled:  None   Airway Patency:  Patent      Post Op Vitals Reviewed: Yes      Staff: Anesthesiologist         There were no known notable events for this encounter.     /65 (08/18/23 1047)    Temp (!) 97.4 °F (36.3 °C) (08/18/23 1047)    Pulse 61 (08/18/23 1047)   Resp (!) 10 (08/18/23 1047)    SpO2 100 % (08/18/23 1047)

## 2023-08-18 NOTE — INTERVAL H&P NOTE
H&P reviewed. After examining the patient I find no changes in the patients condition since the H&P had been written.     Vitals:    08/18/23 0824   BP: 123/70   Pulse: 78   Resp: 18   Temp: (!) 97.1 °F (36.2 °C)   SpO2: 100%

## 2023-08-19 LAB — RHODAMINE-AURAMINE STN SPEC: NORMAL

## 2023-08-20 LAB
BACTERIA SPEC ANAEROBE CULT: NO GROWTH
BACTERIA TISS AEROBE CULT: NO GROWTH
GRAM STN SPEC: NORMAL

## 2023-08-21 LAB
BACTERIA SPEC ANAEROBE CULT: NO GROWTH
BACTERIA TISS AEROBE CULT: NO GROWTH
FUNGUS SPEC CULT: NORMAL
GRAM STN SPEC: NORMAL

## 2023-08-22 LAB
MYCOBACTERIUM SPEC CULT: NORMAL
RHODAMINE-AURAMINE STN SPEC: NORMAL

## 2023-08-28 ENCOUNTER — TELEPHONE (OUTPATIENT)
Dept: HEMATOLOGY ONCOLOGY | Facility: CLINIC | Age: 18
End: 2023-08-28

## 2023-08-28 LAB
FUNGUS SPEC CULT: NORMAL
MYCOBACTERIUM SPEC CULT: NORMAL
RHODAMINE-AURAMINE STN SPEC: NORMAL
SCAN RESULT: NORMAL

## 2023-08-28 NOTE — TELEPHONE ENCOUNTER
Call Transfer   Who are you speaking with? self   If it is not the patient, are they listed on an active communication consent form? self   Who is the patients HemOnc/SurgOnc provider? Anuradha Push   What is the reason for this call? Questions about procedure   Person/Department that the call was transferred to? Time that call was transferred? Maru Sullivan 3:09pm 8/28   Your call will be transferred now. If you receive a voicemail, please leave a detailed message and a member of the team will return your call as soon as possible. Did you relay this information to the caller?  yes

## 2023-08-28 NOTE — TELEPHONE ENCOUNTER
Patient called stating the past 2 days he has developed a little bit of tenderness to touch at the area of incision. He stated a little hard ball developed. Denies fevers, chills, redness around incision. He has not taken any of the prescribed oxycodone. Explained the development of a seroma and he could apply warm compresses and also take motrin as needed. Patient asked if he should take the antibiotics he has at home, advised not to take antibiotics at this time since he is not having any infection symptoms. Patient verbally understands and will call back if pain worsens/persists, the area gets more swollen or develops any fevers, chills, redness.

## 2023-09-05 LAB — FUNGUS SPEC CULT: NORMAL

## 2023-09-06 ENCOUNTER — OFFICE VISIT (OUTPATIENT)
Dept: HEMATOLOGY ONCOLOGY | Facility: CLINIC | Age: 18
End: 2023-09-06
Payer: COMMERCIAL

## 2023-09-06 VITALS
HEIGHT: 66 IN | RESPIRATION RATE: 17 BRPM | SYSTOLIC BLOOD PRESSURE: 112 MMHG | WEIGHT: 175 LBS | DIASTOLIC BLOOD PRESSURE: 82 MMHG | BODY MASS INDEX: 28.12 KG/M2 | OXYGEN SATURATION: 98 % | HEART RATE: 92 BPM | TEMPERATURE: 98.3 F

## 2023-09-06 DIAGNOSIS — R59.0 INGUINAL LYMPHADENOPATHY: Primary | ICD-10-CM

## 2023-09-06 LAB
MYCOBACTERIUM SPEC CULT: NORMAL
RHODAMINE-AURAMINE STN SPEC: NORMAL

## 2023-09-06 PROCEDURE — 99213 OFFICE O/P EST LOW 20 MIN: CPT | Performed by: INTERNAL MEDICINE

## 2023-09-06 NOTE — PROGRESS NOTES
Progress Note: Medical Oncology:   Umer Gonzalez 19 y.o. male MRN: [id number]  Unit/Bed#:  Encounter: [id number]    Assessment and Plan:  1. Reactive Inguinal Lymphadenopathy. 1. Reactive Inguinal Lymphadenopathy:  Patient is a 78-year-old male, with no established medical history; who presents with a 3-year history of painful inguinal lymphadenopathy of unclear etiology. Comprehensive evaluation has been unrevealing, thus far (e.g. Negative Flow Cytometry, BCR-ABL Testing, Cystoscopy, EGD, and Colonoscopy). During recent hospitalization in August 2023, right inguinal lymph node biopsy was obtained (August 18th). Pathology was reviewed and shows a reactive lymph node with paracortical hyperplasia and changes consistent with dermatopathic lymphadenitis. No definitive evidence of a lymphoproliferative disorder or other malignancy. Patient is anticipating establishment of care with Rheumatology. Will request follow-up in 1-year; with repeat labs. 1. Recommend follow-up with Medical Oncology in 1-year, on September 11th, 2024:    A. Will obtain CBC-D, LDH, ESR, and C-Reactive Protein prior to follow-up. Subjective: Interval History: Lore LovettAbelino Luis is a 78-year-old male, with no established medical history; who presents today for hospital follow-up given recurrent, episodic inguinal lymphadenopathy of unclear etiology. Of particular importance, patient was recently admitted at [address] [place] [address] from August 12th through August 15th for painful inguinal lymphadenopathy. On admission, CT Chest, Abdomen, and Pelvis was obtained and showed stable prominent but non-enlarged retroperitoneal lymph nodes (Since January 2022). No splenomegaly or lymphadenopathy by size-criteria noted. Testicular Ultrasound was also obtained and was largely unremarkable. Medical Oncology was consulted during hospital admission, and recommended Surgical Oncology consultation. An excisional biopsy of the right inguinal lymph node was obtained on August 18th. Pathology was reviewed and shows a reactive lymph node with paracortical hyperplasia and changes consistent with dermatopathic lymphadenitis. No definitive evidence of a lymphoproliferative disorder or other malignancy. Patient returns today for follow-up. On evaluation this afternoon, patient feels well. He is without constitutional symptoms, including fever, chills, nightsweats, or unintentional weight-loss. Inguinal lymphadenopathy has resolved at the time of his evaluation today. He clarifies that the episodic, painful inguinal lymphadenopathy occurs sporadically, without an obvious precipitating cause. He has undergone extensive evaluation since June 2022, of which has been largely unrevealing, thus far. He is anticipating follow-up with Rheumatology. Review of Systems:  All systems reviewed and negative except otherwise listed in the History of Present Illness. Historical Information:  No past medical history on file. Past Surgical History:   Procedure Laterality Date   • LYMPH NODE BIOPSY Right 8/18/2023    Procedure: EXCISION RIGHT INGUINAL NODE, LYMPHOMA PROTOCOL;  Surgeon: Manuela Peraza MD;  Location: [facility] MAIN OR;  Service: Surgical Oncology     Social History:  Social History     Substance and Sexual Activity   Alcohol Use Not Currently     Social History     Substance and Sexual Activity   Drug Use Not Currently     Social History     Tobacco Use   Smoking Status Never   Smokeless Tobacco Never     E-Cigarette/Vaping   • E-Cigarette Use Never User      E-Cigarette/Vaping Substances   • Nicotine No    • THC No    • CBD No    • Flavoring No    • Other No    • Unknown No      Family History: non-contributory    Meds/Allergies   all medications and allergies reviewed  No Known Allergies  Objective:  Vitals: Blood pressure 112/82, pulse 92, temperature 98.3 °F (36.8 °C), temperature source Temporal, resp.  rate 17, height 5' 6" (1.676 m), weight 79.4 kg (175 lb), SpO2 98 %. Physical Exam:  General: Alert, and oriented; Pleasant, and conversational; Well-Appearing Male (Appears Stated Age)  HEENT: Atraumatic, and normocephalic; PERRLA; EOMI; Moist mucosal membranes  Neck: Trachea midline; No neck masses, or thyromegaly; No Cervical or Supraclavicular LAD  Cardiovascular: Regular rate and rhythm; No murmurs, rubs, or gallops appreciated; No peripheral edema  Respiratory: Clear to auscultation bilaterally; Adequate aeration; No supplemental oxygen  Abdomen: Soft, non-tender; Non-distended; No Hepatosplenomegaly; Bowel sounds present  Extremities: No obvious deformities; No peripheral edema; Moves all  Neurologic: Appropriately alert, and oriented to Person, Place, and Time; No focal neurologic deficits    Lab Results: I have reviewed all pertinent labs. Imaging: I have personally reviewed pertinent reports. EKG, Pathology, and Other Studies: I have personally reviewed pertinent reports. Patient was seen and discussed with attending physician, Nayeli Peralta M.D.    Luc Sharp D.O.   Hematology-Oncology Fellow (PGY-4)

## 2023-09-11 LAB — FUNGUS SPEC CULT: NORMAL

## 2023-09-12 ENCOUNTER — OFFICE VISIT (OUTPATIENT)
Dept: SURGICAL ONCOLOGY | Facility: CLINIC | Age: 18
End: 2023-09-12

## 2023-09-12 VITALS
WEIGHT: 175.5 LBS | BODY MASS INDEX: 28.21 KG/M2 | RESPIRATION RATE: 16 BRPM | TEMPERATURE: 98 F | SYSTOLIC BLOOD PRESSURE: 130 MMHG | OXYGEN SATURATION: 98 % | DIASTOLIC BLOOD PRESSURE: 84 MMHG | HEART RATE: 78 BPM | HEIGHT: 66 IN

## 2023-09-12 DIAGNOSIS — R59.0 INGUINAL LYMPHADENOPATHY: Primary | ICD-10-CM

## 2023-09-12 LAB
MYCOBACTERIUM SPEC CULT: NORMAL
RHODAMINE-AURAMINE STN SPEC: NORMAL

## 2023-09-12 PROCEDURE — 99024 POSTOP FOLLOW-UP VISIT: CPT | Performed by: SURGERY

## 2023-09-12 NOTE — PROGRESS NOTES
Surgical Oncology Follow Up       [address] [facility]  [address] [facility] [place]  [facility] [place] [address]    Ferdinand Orozco  2005  [id number]  [address] [facility]  [address]  [facility] [place] [address]    Diagnoses and all orders for this visit:    Inguinal lymphadenopathy  -     Ambulatory Referral to Dermatology; Future  -     Ambulatory Referral to Rheumatology; Future        Chief Complaint   Patient presents with   • Post-op       No follow-ups on file. Oncology History    No history exists. History of Present Illness: Patient returns in follow-up. He is doing well. His pathology showed a reactive lymph node. There is no evidence of malignancy. There was also no evidence of infection. He is feeling well. He did have a small seroma that has resolved. Review of Systems  Complete ROS Surg Onc:   Complete ROS Surg Onc:   Constitutional: The patient denies new or recent history of general fatigue, no recent weight loss, no change in appetite. Eyes: No complaints of visual problems, no scleral icterus. ENT: no complaints of ear pain, no hoarseness, no difficulty swallowing,  no tinnitus and no new masses in head, oral cavity, or neck. Cardiovascular: No complaints of chest pain, no palpitations, no ankle edema. Respiratory: No complaints of shortness of breath, no cough. Gastrointestinal: No complaints of jaundice, no bloody stools, no pale stools. Genitourinary: No complaints of dysuria, no hematuria, no nocturia, no frequent urination, no urethral discharge. Musculoskeletal: No complaints of weakness, paralysis, joint stiffness or arthralgias. Integumentary: No complaints of rash, no new lesions. Neurological: No complaints of convulsions, no seizures, no dizziness. Hematologic/Lymphatic: No complaints of easy bruising.    Endocrine:  No hot or cold intolerance. No polydipsia, polyphagia, or polyuria. Allergy/immunology:  No environmental allergies. No food allergies. Not immunocompromised. Skin:  No pallor or rash. No wound. Patient Active Problem List   Diagnosis   • Blood in stool   • Gastroesophageal reflux disease   • H/O epididymitis   • Inguinal lymphadenopathy   • Positive blood culture   • Nonintractable episodic headache     No past medical history on file. Past Surgical History:   Procedure Laterality Date   • LYMPH NODE BIOPSY Right 8/18/2023    Procedure: EXCISION RIGHT INGUINAL NODE, LYMPHOMA PROTOCOL;  Surgeon: Allen Wilson MD;  [facility];  Service: Surgical Oncology     No family history on file.   Social History     Socioeconomic History   • Marital status: Single     Spouse name: Not on file   • Number of children: Not on file   • Years of education: Not on file   • Highest education level: Not on file   Occupational History   • Not on file   Tobacco Use   • Smoking status: Never   • Smokeless tobacco: Never   Vaping Use   • Vaping Use: Never used   Substance and Sexual Activity   • Alcohol use: Not Currently   • Drug use: Not Currently   • Sexual activity: Not on file   Other Topics Concern   • Not on file   Social History Narrative   • Not on file     Social Determinants of Health     Financial Resource Strain: Not on file   Food Insecurity: Not on file   Transportation Needs: Not on file   Physical Activity: Not on file   Stress: Not on file   Social Connections: Not on file   Intimate Partner Violence: Not on file   Housing Stability: Not on file       Current Outpatient Medications:   •  ketorolac (TORADOL) 10 mg tablet, Take 1 tablet (10 mg total) by mouth every 6 (six) hours as needed for moderate pain for up to 5 days, Disp: 20 tablet, Rfl: 0  No Known Allergies  Vitals:    09/12/23 1515   BP: 130/84   Pulse: 78   Resp: 16   Temp: 98 °F (36.7 °C)   SpO2: 98%       Physical Exam  Constitutional: General appearance: The Patient is well-developed and well-nourished who appears the stated age in no acute distress. Patient is pleasant and talkative. HEENT:  Normocephalic. Sclerae are anicteric. Mucous membranes are moist.    Extremities: There is no clubbing or cyanosis. There is no edema. Symmetric. Neuro: Grossly nonfocal. Gait is normal.     Skin: Warm, anicteric. Incision is C/D/I. Psych:  Patient is pleasant and talkative. Breasts:        Pathology:  Final Diagnosis   LYMPH NODE, RIGHT INGUINAL, EXCISIONAL BIOPSY: REACTIVE LYMPH NODE WITH PARACORTICAL HYPERPLASIA AND CHANGES CONSISTENT WITH DERMATOPATHIC LYMPHADENITIS; NEGATIVE FOR LYMPHOPROLIFERATIVE DISORDER OR OTHER MALIGNANCY; SEE IMPRESSION AND COMMENT     IMPRESSION:  The overall findings are in keeping with a reactive lymph node with no definitive evidence of a lymphoproliferative disorder or other malignancy. The findings are non-specific and can be seen in a variety of settings, including dermatopathic lymphadenitis, recent or chronic infection, autoimmune disease, trauma, etc., and can also be idiopathic. Recommend skin exam and / or dermatology consultation, if clinically indicated. The presence of pathology elsewhere or nearby cannot be excluded. If there is persistent clinical or radiologic concern for lymphoma, malignancy or other pathology, additional tissue would be required for lymphoma workup, to include flow cytometry. See concurrent microbiology cultures for further characterization. Correlation with imaging studies as well as with clinical and other laboratory findings is recommended.  See comment for microscopic description.     COMMENT:  H&E stained sections show sections of an enlarged lymph node with relatively intact lymph node architecture, with paracortical hyperplasia and expansion, with background small primary follicles and small, atrophic secondary follicles, the latter of which, show vaguely polarized germinal centers with scattered tingible body macrophages. There are increased interdigitating dendritic cells, Langerhans cells and pigment-laden macrophages. The capsule is focally thickened. The sinuses are prominent - open and patent with histiocytes and inflammatory cells with possible emperipolesis. By cytomorphology, the lymphocytes are predominantly small and mature, with scant cytoplasm, round to slightly irregular nuclear contours and condensed chromatin with inconspicuous nucleoli. There are scattered larger cells with variable amounts of cytoplasm, round to irregular nuclear contours, open chromatin and prominent nucleoli, consistent with presumed immunoblasts. There are mildly increased, scattered and focally clustered plasma cells. There are no granulomata, areas of necrosis or overt viral cytopathic changes. There are not increased mitotic figures or apoptotic bodies. Touch preparations show similar findings.      By immunohistochemistry, CD3 shows that the paracortical expansion is composed predominantly of T-cells. CD20 and PAX5 highlight background B-cell follicles. CD23 highlights focal areas of follicular dendritic cell meshworks. CD10 and BCL6 highlight germinal center B-cells. BCL2 highlights B-cells, T-cells and plasma cells and is appropriately negative within germinal centers. BCL1 is negative within lymphocytes. CD30 highlights increased, scattered immunoblasts. CD68 highlights increased histiocytes; S100 and MUM-1 highlight increased interdigitating dendritic cells and Langerhans cells; Langerhans cells co-express CD1a and langerin, and negative for BRAF. MUM-1 also highlights scattered plasma cells, which are polytypic by kappa/lambda in situ hybridization (PATRIC). CD31 highlights vessels and sinuses. EBV encoded RNA (CORONA) PATRIC, CMV, HSV, adenovirus, Toxoplasma, HHV8, VZV and parvovirus stains are negative. PAS and GMS stains are negative for fungal forms. An AFB stain is negative for acid fast bacilli. STEPH is negative for microorganisms. A spirochete stain is negative. Pan-cytokeratin AE1/3 is negative for metastatic carcinoma. All stains are performed with appropriate controls.      FLOW CYTOMETRY STUDIES:  Flow cytometry studies are performed and reportedly show polytypic B-cells (28.9%) and mixed T-cell subsets (62.25%; normal CD4:CD8 ratio of 3.59:1); ([id number] #[id number]; see separate report). Electronically signed by Milton Hayward MD on 9/6/2023 at  9:25 AM         Labs:      Imaging  No results found. I personally reviewed and interpreted the above laboratory and imaging data. Discussion/Summary: 78-year-old male with reactive lymphadenopathy. This is intermittently painful. The etiology is unclear. There was no evidence of malignancy or infection. He has already seen medical oncology. I will set him up with dermatology because of some of the findings on the pathology report. I have also recommended that he see rheumatology. I will place those referrals. I will see him back in the future should the need arise. He is agreeable to this. All his questions were answered.

## 2023-09-12 NOTE — LETTER
September 12, 2023     [place] [id number]    Patient: Nancy Casey   YOB: 2005   Date of Visit: 9/12/2023       Dear Dr. Maite Albright:    Thank you for referring Kalee Schafer to me for evaluation. Below are my notes for this consultation. If you have questions, please do not hesitate to call me. I look forward to following your patient along with you. Sincerely,        Ginger Weller MD        CC: MD Ginger Atkins MD  9/12/2023  3:28 PM  Incomplete               Surgical Oncology Follow Up       [address] [facility] SURG[facility] ONCOL[facility] [address] [address]    Jaqueline Home Vesdel  2005  [id number]  [address] [facility] [place]  [address] [facility] [address]    Diagnoses and all orders for this visit:    Inguinal lymphadenopathy  -     Ambulatory Referral to Dermatology; Future  -     Ambulatory Referral to Rheumatology; Future        Chief Complaint   Patient presents with   • Post-op       No follow-ups on file. Oncology History    No history exists. History of Present Illness: Patient returns in follow-up. He is doing well. His pathology showed a reactive lymph node. There is no evidence of malignancy. There was also no evidence of infection. He is feeling well. He did have a small seroma that has resolved. Review of Systems  Complete ROS Surg Onc:   Complete ROS Surg Onc:   Constitutional: The patient denies new or recent history of general fatigue, no recent weight loss, no change in appetite. Eyes: No complaints of visual problems, no scleral icterus. ENT: no complaints of ear pain, no hoarseness, no difficulty swallowing,  no tinnitus and no new masses in head, oral cavity, or neck. Cardiovascular: No complaints of chest pain, no palpitations, no ankle edema.    Respiratory: No complaints of shortness of breath, no cough. Gastrointestinal: No complaints of jaundice, no bloody stools, no pale stools. Genitourinary: No complaints of dysuria, no hematuria, no nocturia, no frequent urination, no urethral discharge. Musculoskeletal: No complaints of weakness, paralysis, joint stiffness or arthralgias. Integumentary: No complaints of rash, no new lesions. Neurological: No complaints of convulsions, no seizures, no dizziness. Hematologic/Lymphatic: No complaints of easy bruising. Endocrine:  No hot or cold intolerance. No polydipsia, polyphagia, or polyuria. Allergy/immunology:  No environmental allergies. No food allergies. Not immunocompromised. Skin:  No pallor or rash. No wound. Patient Active Problem List   Diagnosis   • Blood in stool   • Gastroesophageal reflux disease   • H/O epididymitis   • Inguinal lymphadenopathy   • Positive blood culture   • Nonintractable episodic headache     No past medical history on file. Past Surgical History:   Procedure Laterality Date   • LYMPH NODE BIOPSY Right 8/18/2023    Procedure: EXCISION RIGHT INGUINAL NODE, LYMPHOMA PROTOCOL;  Surgeon: Aida Marcum MD;  [facility];  Service: Surgical Oncology     No family history on file.   Social History     Socioeconomic History   • Marital status: Single     Spouse name: Not on file   • Number of children: Not on file   • Years of education: Not on file   • Highest education level: Not on file   Occupational History   • Not on file   Tobacco Use   • Smoking status: Never   • Smokeless tobacco: Never   Vaping Use   • Vaping Use: Never used   Substance and Sexual Activity   • Alcohol use: Not Currently   • Drug use: Not Currently   • Sexual activity: Not on file   Other Topics Concern   • Not on file   Social History Narrative   • Not on file     Social Determinants of Health     Financial Resource Strain: Not on file   Food Insecurity: Not on file   Transportation Needs: Not on file   Physical Activity: Not on file   Stress: Not on file   Social Connections: Not on file   Intimate Partner Violence: Not on file   Housing Stability: Not on file       Current Outpatient Medications:   •  ketorolac (TORADOL) 10 mg tablet, Take 1 tablet (10 mg total) by mouth every 6 (six) hours as needed for moderate pain for up to 5 days, Disp: 20 tablet, Rfl: 0  No Known Allergies  Vitals:    09/12/23 1515   BP: 130/84   Pulse: 78   Resp: 16   Temp: 98 °F (36.7 °C)   SpO2: 98%       Physical Exam  Constitutional: General appearance: The Patient is well-developed and well-nourished who appears the stated age in no acute distress. Patient is pleasant and talkative. HEENT:  Normocephalic. Sclerae are anicteric. Mucous membranes are moist.    Extremities: There is no clubbing or cyanosis. There is no edema. Symmetric. Neuro: Grossly nonfocal. Gait is normal.     Skin: Warm, anicteric. Incision is C/D/I. Psych:  Patient is pleasant and talkative. Breasts:        Pathology:  Final Diagnosis   LYMPH NODE, RIGHT INGUINAL, EXCISIONAL BIOPSY: REACTIVE LYMPH NODE WITH PARACORTICAL HYPERPLASIA AND CHANGES CONSISTENT WITH DERMATOPATHIC LYMPHADENITIS; NEGATIVE FOR LYMPHOPROLIFERATIVE DISORDER OR OTHER MALIGNANCY; SEE IMPRESSION AND COMMENT     IMPRESSION:  The overall findings are in keeping with a reactive lymph node with no definitive evidence of a lymphoproliferative disorder or other malignancy. The findings are non-specific and can be seen in a variety of settings, including dermatopathic lymphadenitis, recent or chronic infection, autoimmune disease, trauma, etc., and can also be idiopathic. Recommend skin exam and / or dermatology consultation, if clinically indicated. The presence of pathology elsewhere or nearby cannot be excluded. If there is persistent clinical or radiologic concern for lymphoma, malignancy or other pathology, additional tissue would be required for lymphoma workup, to include flow cytometry.  See concurrent microbiology cultures for further characterization. Correlation with imaging studies as well as with clinical and other laboratory findings is recommended. See comment for microscopic description. COMMENT:  H&E stained sections show sections of an enlarged lymph node with relatively intact lymph node architecture, with paracortical hyperplasia and expansion, with background small primary follicles and small, atrophic secondary follicles, the latter of which, show vaguely polarized germinal centers with scattered tingible body macrophages. There are increased interdigitating dendritic cells, Langerhans cells and pigment-laden macrophages. The capsule is focally thickened. The sinuses are prominent - open and patent with histiocytes and inflammatory cells with possible emperipolesis. By cytomorphology, the lymphocytes are predominantly small and mature, with scant cytoplasm, round to slightly irregular nuclear contours and condensed chromatin with inconspicuous nucleoli. There are scattered larger cells with variable amounts of cytoplasm, round to irregular nuclear contours, open chromatin and prominent nucleoli, consistent with presumed immunoblasts. There are mildly increased, scattered and focally clustered plasma cells. There are no granulomata, areas of necrosis or overt viral cytopathic changes. There are not increased mitotic figures or apoptotic bodies. Touch preparations show similar findings. By immunohistochemistry, CD3 shows that the paracortical expansion is composed predominantly of T-cells. CD20 and PAX5 highlight background B-cell follicles. CD23 highlights focal areas of follicular dendritic cell meshworks. CD10 and BCL6 highlight germinal center B-cells. BCL2 highlights B-cells, T-cells and plasma cells and is appropriately negative within germinal centers. BCL1 is negative within lymphocytes. CD30 highlights increased, scattered immunoblasts.  CD68 highlights increased histiocytes; S100 and MUM-1 highlight increased interdigitating dendritic cells and Langerhans cells; Langerhans cells co-express CD1a and langerin, and negative for BRAF. MUM-1 also highlights scattered plasma cells, which are polytypic by kappa/lambda in situ hybridization (PATRIC). CD31 highlights vessels and sinuses. EBV encoded RNA (CORONA) PATRIC, CMV, HSV, adenovirus, Toxoplasma, HHV8, VZV and parvovirus stains are negative. PAS and GMS stains are negative for fungal forms. An AFB stain is negative for acid fast bacilli. STEPH is negative for microorganisms. A spirochete stain is negative. Pan-cytokeratin AE1/3 is negative for metastatic carcinoma. All stains are performed with appropriate controls. FLOW CYTOMETRY STUDIES:  Flow cytometry studies are performed and reportedly show polytypic B-cells (28.9%) and mixed T-cell subsets (62.25%; normal CD4:CD8 ratio of 3.59:1); ([id number] #[id number]; see separate report). Electronically signed by Corine Weston MD on 9/6/2023 at  9:25 AM         Labs:      Imaging  No results found. I personally reviewed and interpreted the above laboratory and imaging data. Discussion/Summary: 75-year-old male with reactive lymphadenopathy. This is intermittently painful. The etiology is unclear. There was no evidence of malignancy or infection. He has already seen medical oncology. I will set him up with dermatology because of some of the findings on the pathology report. I have also recommended that he see rheumatology. I will place those referrals. I will see him back in the future should the need arise. He is agreeable to this. All his questions were answered.

## 2023-09-18 LAB — FUNGUS SPEC CULT: NORMAL

## 2023-09-19 LAB
MYCOBACTERIUM SPEC CULT: NORMAL
RHODAMINE-AURAMINE STN SPEC: NORMAL

## 2023-09-26 LAB
MYCOBACTERIUM SPEC CULT: NORMAL
RHODAMINE-AURAMINE STN SPEC: NORMAL

## 2023-10-03 LAB
MYCOBACTERIUM SPEC CULT: NORMAL
RHODAMINE-AURAMINE STN SPEC: NORMAL

## 2023-10-04 ENCOUNTER — CONSULT (OUTPATIENT)
Dept: ENDOCRINOLOGY | Facility: HOSPITAL | Age: 18
End: 2023-10-04
Payer: COMMERCIAL

## 2023-10-04 VITALS
BODY MASS INDEX: 28.38 KG/M2 | HEART RATE: 87 BPM | SYSTOLIC BLOOD PRESSURE: 120 MMHG | OXYGEN SATURATION: 97 % | HEIGHT: 66 IN | WEIGHT: 176.6 LBS | DIASTOLIC BLOOD PRESSURE: 80 MMHG

## 2023-10-04 DIAGNOSIS — R59.0 INGUINAL LYMPHADENOPATHY: ICD-10-CM

## 2023-10-04 DIAGNOSIS — R53.82 CHRONIC FATIGUE: Primary | ICD-10-CM

## 2023-10-04 DIAGNOSIS — Z87.438 H/O EPIDIDYMITIS: ICD-10-CM

## 2023-10-04 PROCEDURE — 99204 OFFICE O/P NEW MOD 45 MIN: CPT | Performed by: STUDENT IN AN ORGANIZED HEALTH CARE EDUCATION/TRAINING PROGRAM

## 2023-10-04 NOTE — PROGRESS NOTES
10/4/2023    Assessment/Plan        Problem List Items Addressed This Visit          Immune and Lymphatic    Inguinal lymphadenopathy    Relevant Orders    T4, free    TSH, 3rd generation    Testosterone, free, total    Follicle stimulating hormone    Luteinizing hormone       Other    H/O epididymitis    Relevant Orders    T4, free    TSH, 3rd generation    Testosterone, free, total    Follicle stimulating hormone    Luteinizing hormone     Other Visit Diagnoses       Chronic fatigue    -  Primary    Relevant Orders    T4, free    TSH, 3rd generation    Testosterone, free, total    Follicle stimulating hormone    Luteinizing hormone            Assessment/Plan:  Patient is a 18yM GERD with recurrent epidymidis who presents today to discuss his lymphadenopathy     1) Lymphadenopathy- discussed this is not an hormone driven process and thus does not need any hormonal workup for this     2) Recurrent epidymidis:-  patient does not have any specific symptoms concerning for hypogonadism. But given his history and issues with fatigue and myalgia will check TSH, Free T4 and also Testosterone level     RTC PRN      CC: Lymphadenopathy    History of Present Illness     HPI: Shauna Burks is a 19y.o. year old male with history of GERD, recurrent epididymitis referred to us after being found to have bacteremia with groin pain leading to hospitalization for SIRS. Found to have an inguinal retroperitoneal Lymphadenopathy intially seen on CT in 01/22 and stable on repeat CT 08/23. Was referred to endocrine and Heme for lymphadenopathy. Underwent LN resection through Dr Ap Chadwick which was neg for malignancy and indicates reactive process. Patient was also found to have splenomegaly on recent CT. Patient has seen many specialists so far for his lymphadenopathy. Patient reports he has been having issues with sporadic lymphadenopathy since 2020, can be weeks, months or days without any known triggers.  Reports feeling tired, myalgia when has these. Denies any weight changes, denies any constipation, diarrhea, hair loss, low libido, ED, hair loss. Stays active and healthy     Family hx- grandfather has DM   Social hx- does not smoke, works at [employer]   Constitutional:  Positive for fatigue. Negative for fever and unexpected weight change. HENT:  Negative for ear pain, trouble swallowing and voice change. Eyes:  Negative for visual disturbance. Respiratory:  Negative for cough and shortness of breath. Cardiovascular:  Negative for chest pain and palpitations. Gastrointestinal:  Negative for constipation and diarrhea. Endocrine: Negative. Genitourinary:  Negative for hematuria. Musculoskeletal:  Positive for arthralgias. Negative for back pain. Skin:  Negative for color change and rash. Neurological:  Negative for syncope and weakness. Psychiatric/Behavioral: Negative. All other systems reviewed and are negative. Historical Information   No past medical history on file. Past Surgical History:   Procedure Laterality Date    LYMPH NODE BIOPSY Right 8/18/2023    Procedure: EXCISION RIGHT INGUINAL NODE, LYMPHOMA PROTOCOL;  Surgeon: Aleksandra Villalobos MD;  [facility];  Service: Surgical Oncology     Social History   Social History     Substance and Sexual Activity   Alcohol Use Not Currently     Social History     Substance and Sexual Activity   Drug Use Not Currently     Social History     Tobacco Use   Smoking Status Never   Smokeless Tobacco Never     Family History: No family history on file. Meds/Allergies   Current Outpatient Medications   Medication Sig Dispense Refill    ketorolac (TORADOL) 10 mg tablet Take 1 tablet (10 mg total) by mouth every 6 (six) hours as needed for moderate pain for up to 5 days 20 tablet 0     No current facility-administered medications for this visit. No Known Allergies    Objective   Vitals: There were no vitals taken for this visit.   Invasive Devices       None                 There is no height or weight on file to calculate BMI. There were no vitals taken for this visit. Wt Readings from Last 3 Encounters:   09/12/23 79.6 kg (175 lb 8 oz)   09/06/23 79.4 kg (175 lb)   08/18/23 77.1 kg (170 lb)       GEN: NAD  E/n/m nl facies, hearing intact bilat, tongue midline, lips nl  Eyes: no stare or proptosis, nl lids and conjunctiva, EOMI  Neck: trachea midline, thyroid NT to palpation, nl in size, no nodules or neck masses noted, no cervical LAD  CV; heart reg rate s1s2 nl, no m/r/g appreciated, no CHRISTINE  Resp: CTAB, good effort  Ab+BS  Neuro: no tremor, 2+ DTRs in BUE  MS: no c/c in digits, moves all 4 ext, nl muscle bulk, gait nl  Skin: warm and dry, no palmar erythema  Ext: no c/c in digits, no edema bilaterally, 2+ DP and PT pulses bilat, no breaks in skin/ulcers on feet, sensation intact to monofilament testing on plantar surfaces bilat  Psych: nl mood and affect, no gross lapses in memory    Physical Exam    The history was obtained from the review of the chart and from the patient.     Lab Results:      No components found for: "HA1C"  No components found for: "GLU"    Lab Results   Component Value Date    CREATININE 0.82 08/14/2023    CREATININE 0.91 08/13/2023    CREATININE 1.04 08/12/2023    BUN 16 08/14/2023    K 4.0 08/14/2023     (H) 08/14/2023    CO2 25 08/14/2023     eGFR   Date Value Ref Range Status   08/14/2023 127 ml/min/1.73sq m Final     No components found for: "MALBCRER"    No results found for: "CHOL", "HDL", "TRIG", "CHOLHDL"    Lab Results   Component Value Date    ALT 13 08/13/2023    AST 15 08/13/2023    ALKPHOS 54 08/13/2023       No results found for: "TSH", "FREET4", "TSI"    Future Appointments   Date Time Provider [facility] [address]   10/4/2023 11:00 AM Felisa Rowland MD [facility]   11/17/2023  1:00 PM Rey Rodgers DO [facility]   9/11/2024  3:20 PM MD Javed Felder [facility]

## 2023-11-17 ENCOUNTER — APPOINTMENT (OUTPATIENT)
Dept: LAB | Facility: CLINIC | Age: 18
End: 2023-11-17
Payer: COMMERCIAL

## 2023-11-17 ENCOUNTER — E-CONSULT (OUTPATIENT)
Dept: OTHER | Facility: HOSPITAL | Age: 18
End: 2023-11-17
Payer: COMMERCIAL

## 2023-11-17 ENCOUNTER — CONSULT (OUTPATIENT)
Dept: RHEUMATOLOGY | Facility: CLINIC | Age: 18
End: 2023-11-17
Payer: COMMERCIAL

## 2023-11-17 VITALS
BODY MASS INDEX: 28.9 KG/M2 | HEIGHT: 66 IN | DIASTOLIC BLOOD PRESSURE: 80 MMHG | SYSTOLIC BLOOD PRESSURE: 122 MMHG | WEIGHT: 179.8 LBS

## 2023-11-17 DIAGNOSIS — R59.0 INGUINAL LYMPHADENOPATHY: Primary | ICD-10-CM

## 2023-11-17 DIAGNOSIS — Z90.79: ICD-10-CM

## 2023-11-17 DIAGNOSIS — R59.0 INGUINAL LYMPHADENOPATHY: ICD-10-CM

## 2023-11-17 DIAGNOSIS — R53.82 CHRONIC FATIGUE: ICD-10-CM

## 2023-11-17 LAB — ANA SER QL IA: NEGATIVE

## 2023-11-17 PROCEDURE — 86038 ANTINUCLEAR ANTIBODIES: CPT

## 2023-11-17 PROCEDURE — 99204 OFFICE O/P NEW MOD 45 MIN: CPT | Performed by: STUDENT IN AN ORGANIZED HEALTH CARE EDUCATION/TRAINING PROGRAM

## 2023-11-17 PROCEDURE — 99451 NTRPROF PH1/NTRNET/EHR 5/>: CPT | Performed by: INTERNAL MEDICINE

## 2023-11-17 PROCEDURE — 36415 COLL VENOUS BLD VENIPUNCTURE: CPT

## 2023-11-17 NOTE — PROGRESS NOTES
Rheumatology Outpatient Consult Note  11/17/2023       Yaquelin Reid MD  [address]  [address],  [address] [facility]    Reason for referral: inguinal lymphadenopathy    Assessment: 19 y.o. male referred for the above. Ddx:    SLE  Unlikely given no other apparent organ involvement; in fact he has been leukocytotic in the past rather than leukopenic, specifically neutrophils. Sarcoid  Bx w no granulomas so not likely this. Also no hilar or mediastinal LAD    Castleman's  Given unremarkable biopsy, this is essentially ruled out    Amyloid  No evidence of other organ involvement    I am more suspicious for an infectious etiology such as EBV. He says that when he gets these episodes he also gets back pain which he was told was from his spleen enlarging. No imaging evidence of this but with associated elevated temps, chronic viral illness is in my ddx. Would also explain the reactive nature of his LAD, and viral illness could also explain his hyperesthesias during episodes    It would be helpful to have objective evidence of splenic enlargement during one of these episodes. Encounter Diagnosis     ICD-10-CM    1. Inguinal lymphadenopathy  R59.0 Ambulatory Referral to Rheumatology          Plan:  -ID E-consult  -JAMES, as exhaustive workup thus far has been otherwise negative. Advised that + test does not mean he has SLE or a related disease  -RTC 6 mos or sooner PRN    Rey Rodgers DO, CARLO Rodgers DO, [facility] Rheumat[facility]     History: Giuseppe Camejo is a(n) 19 y.o. male who is referred for the above. Recent CT C/A/P wo evidence of new LAD including in the inguinal region. Did have stable prominent but nonenlarged RP LN stable since 2022. Has undergone R inguinal LN excision in August 2023; pathology showed no evidence of lymphoproliferative disorders, infection, granulomas and was most consistent with reactive LN. Dermatology cs was placed, pending.     3 years ago, started getting LN swelling around the groin. Lots of pain, debilitating when it happens. Has seen several different specialists for it. Usually gets associated chills. When this happens, feels achy all over. Even has scalp tenderness. Has had colonoscopies, cystoscopies, etc with no answers. Comes and goes. Doesn't start at a particular time of day but mostly in the mornings. Pain takes about 2 days to completely resolve; if he stays in bed enough the first day, then may go away in one day. Takes ketorolac which helps a lot. Time in between can be a few weeks or up to a year. Most recently last Sunday, previously 2-3 months before that. Scrotum gets very sensitive when this happens and can get itchy, but no testicular pain that he can tell. Gets red but no swelling that he can tell. Does get mildly elevated temps when this happens, 99 or 100. Does get chills as well. No night sweats. No unintentional weight loss. No cough. No sore throat. No neck, armpit LN swelling that he can tell. No rashes. No SOB. No pleuritic pain. No uveitis. No nosebleeds, hemoptysis. Occasional constipation. Does occasionally have blood in the stool, intermittent, there are times when he has a BM and the toilet bowl looks completely red. Hasn't happened in a while. Sometimes will get bright red blood on toilet paper, sometimes dripping out of him. Happens more if he is straining to have a BM. No associated pain. The day after most recent LN swelling, stomach has been hurting a lot. Feels like his intestines are "in a vice." Improves as the day goes on. Comes in waves. This had never happened before. Pain in a band across the middle of the stomach, not epigastric. Not more constipated than usual. No associated fevers. Wrist pain when using his hands a lot, no AM stiffness.  Few minutes of back stiffness in the AM, resolves with stretching    He tells me that biopsy was not done during a time where there was pain    Works with [employer], very physically demanding    History reviewed. No pertinent past medical history. Past Surgical History:   Procedure Laterality Date    LYMPH NODE BIOPSY Right 8/18/2023    Procedure: EXCISION RIGHT INGUINAL NODE, LYMPHOMA PROTOCOL;  Surgeon: Ginger Weller MD;  Location: [facility];  Service: Surgical Oncology       No outpatient medications have been marked as taking for the 11/17/23 encounter (Consult) with Maegan Lombardi DO. Allergies as of 11/17/2023    (No Known Allergies)       History reviewed. No pertinent family history. Social History:  Social History     Tobacco Use    Smoking status: Never    Smokeless tobacco: Never   Vaping Use    Vaping Use: Never used   Substance Use Topics    Alcohol use: Not Currently    Drug use: Not Currently       Review of Systems: Pertinent findings documented in HPI    ___________________________________    Physical Exam:    /80   Ht 5' 6" (1.676 m)   Wt 81.6 kg (179 lb 12.8 oz)   BMI 29.02 kg/m²     General: Well appearing, in no distress. Eyes: EOMI  HENT: No oral ulcers. MMM. Heart: Regular rate and rhythm, no murmurs. Lungs: Lungs clear bilaterally without wheezes or crackles. Extremities: Warm, well perfused, no edema. Neuro: Alert and oriented. Skin: No rashes. Musculoskeletal exam: no tenderness to palpation or synovitis any joint.  No palpable or tender cervical, supraclavicular, axillary LNs  ____________________________    Lab Result Review: relevant labs reviewed    Imaging Result Review: relevant images reviewed

## 2023-11-17 NOTE — PROGRESS NOTES
E-Consult  Florencia Lau 19 y.o. male MRN: [id number]  Encounter Date: 11/17/23      Reason for Consult / Principal Problem:   18 yo M recurrent inguinal LAD with elevated temps; was told that he was also getting spleen enlargement during episodes. Malignancy w/u negative, excisional LN bx suggests reactive LAD. Chronic viral ( ex: EBV) as etio? Appreciate recs on further testing     Consulting Provider: Derek Butler MD    Requesting Provider: Yana Pelayo DO     Briefly the patient is a 23-year-old gentleman without significant past medical or surgical history but does have recurrent inguinal lymphadenopathy with pain associated and sometimes fever. Etiology remains uncertain. Clinical course reviewed below:    12/19/2021--episode of acute left-sided epididymoorchitis--seen on imaging. Urine gonorrhea/chlamydia testing negative    1/18/2022--ER visit for recurrent pain, ultrasound negative. CT imaging otherwise negative, ? Mesenteric adenitis. Felt to be residual from prior. February 2022--was seen by urology in follow-up and largely asymptomatic. Referral was placed to GI given reports of rectal bleeding in the weeks prior. May 2022--presented for similar complaints of pain and ultrasound showed hyperemia of the right testicle and epididymis along with a hydrocele. White count was elevated and so he was recommended for antibiotics. Was then seen in follow-up by urology and at that time recommended for repeat CBC along with follow-up with GI. He was also seen by GI in May. June 2022--seen in the ER again for similar complaints and CT imaging at that time showed splenomegaly with some reactive retroperitoneal and mesenteric adenopathy. The image was without contrast.  Patient was recommended for follow-up with urology/GI and now hematology. Patient was seen by hematology in June and recommended for PET/CT imaging and flow cytometry--later negative.   Adenopathy seen on the imaging possibly reactive and so recommended for follow-up imaging in about 3 months. Tumor markers checked by urology were negative. August 2022--- underwent cystoscopy which was negative. Prior EGD and colonoscopy were negative. August 2023--recurrent episode with inguinal adenopathy. Ultrasound at that time unremarkable. CT chest/abdomen/pelvis with stable retroperitoneal lymph nodes and no splenomegaly. 1 of 2 blood cultures at that time are returned positive for group B strep.,  Syphilis testing was negative. Urine gonorrhea/chlamydia was negative. Patient was admitted at that time after call back for the positive blood culture. Felt to be contaminant later. He was later discharged and then as an outpatient underwent excision of a right inguinal lymph node. Biopsy results reviewed and findings essentially nonspecific. There is no definitive lymphoproliferative disorder or malignancy seen. Staining of the lymph node for EBV, CMV, HSV, toxo, HHV 8, VZV and parvovirus along with pass and GMS staining and AFB staining negative. Annemarie staining also negative along with staining for spirochete organisms. Tissue cultures from the site including AFB viral cultures, viral cultures, Fungal cultures and bacterial cultures negative. Most recent CBC and chemistry unremarkable. No significant data noted in Care Everywhere. Patient was most recently seen by rheumatology and referred for e-consult with question of chronic viral infection leading to these episodes. ASSESSMENT/RECOMMENDATIONS:  1. Recurrent inguinal LAD. Based on review of patient's extensive work-up my suspicion is low for an ongoing infection contributing to these episodes. It is possible that he may have had a recent viral infection leading to this most recent episode in 2023 after having had a gap of events since 2022.   As for the continuous episodes throughout [id number] given the patient's age and relatively negative work-up would question if he has a cyclical syndrome that is developing or possible histiocytic disorder. In an otherwise immunocompetent host there is no treatment or lab monitoring that would be recommended for EBV/CMV and his recent negative imaging and lymph node biopsy would make the diagnosis less likely.    -No testing for EBV/CMV recommended as testing is essentially serologic and would indicate previous exposure but would not offer any guidance in terms of treatment or monitoring  -There is no role for EBV or CMV PCR outside of the transplant population  -Would consider further work-up for cyclical syndromes and other histiocytic disorders  -Overall patient may need to be monitored in the future for any new/identifying symptoms which could further guide work-up of his underlying condition. Please call our office if further questions    Total time spent >5 min, >50% time spent reviewing/analyzing record, written report will be generated in the EMR. Gwen Cooper

## 2023-12-04 ENCOUNTER — TELEPHONE (OUTPATIENT)
Dept: RHEUMATOLOGY | Facility: CLINIC | Age: 18
End: 2023-12-04

## 2023-12-04 NOTE — TELEPHONE ENCOUNTER
----- Message from Samantha Wright, DO sent at 12/3/2023 10:12 PM EST -----  No concerns, negative test    Confident that there is no rheumatologic process at this point, will maintain scheduled follow up    Please let him know that JAMES was negative, no need to come back sooner than May unless things change

## 2023-12-04 NOTE — RESULT ENCOUNTER NOTE
No concerns, negative test    Confident that there is no rheumatologic process at this point, will maintain scheduled follow up    Please let him know that JAMES was negative, no need to come back sooner than May unless things change

## 2023-12-05 NOTE — TELEPHONE ENCOUNTER
Called patient and explained results of ID e-consult    Patient's hasn't had any further flares    With lack of consistent pattern of timing between episodes and lack of true fevers I think that an autoinflammatory disease isn't likely    Patient's PCP was considering having him get second opinions at another center, which I fully support    Explained process to get records transferred (would have to get records request form from new practice and get it to [facility])    Kina Hall expressed understanding and all questions were answered to their satisfaction.

## 2024-05-16 ENCOUNTER — TELEPHONE (OUTPATIENT)
Age: 19
End: 2024-05-16

## 2024-05-16 NOTE — TELEPHONE ENCOUNTER
Maddie from  called in requesting to speak Prudence in regards to the patient insurance referral .    I sent Prudence an [employer] message relaying maddie message

## (undated) DEVICE — SUT MONOCRYL 4-0 PS-2 27 IN Y426H

## (undated) DEVICE — SPECIMEN CONTAINER STERILE PEEL PACK

## (undated) DEVICE — GLOVE INDICATOR PI UNDERGLOVE SZ 8 BLUE

## (undated) DEVICE — SCD SEQUENTIAL COMPRESSION COMFORT SLEEVE LARGE KNEE LENGTH: Brand: KENDALL SCD

## (undated) DEVICE — 3000CC GUARDIAN II: Brand: GUARDIAN

## (undated) DEVICE — PLUMEPEN PRO 10FT

## (undated) DEVICE — MEDI-VAC YANK SUCT HNDL W/TPRD BULBOUS TIP: Brand: CARDINAL HEALTH

## (undated) DEVICE — CHLORAPREP HI-LITE 26ML ORANGE

## (undated) DEVICE — SUT SILK 2-0 SH 30 IN K833H

## (undated) DEVICE — BETHLEHEM UNIVERSAL MINOR GEN: Brand: CARDINAL HEALTH

## (undated) DEVICE — INTENDED FOR TISSUE SEPARATION, AND OTHER PROCEDURES THAT REQUIRE A SHARP SURGICAL BLADE TO PUNCTURE OR CUT.: Brand: BARD-PARKER SAFETY BLADES SIZE 15, STERILE

## (undated) DEVICE — GAUZE SPONGES,USP TYPE VII GAUZE, 12 PLY: Brand: CURITY

## (undated) DEVICE — 3M™ TEGADERM™ TRANSPARENT FILM DRESSING FRAME STYLE, 1626W, 4 IN X 4-3/4 IN (10 CM X 12 CM), 50/CT 4CT/CASE: Brand: 3M™ TEGADERM™

## (undated) DEVICE — 3M™ STERI-STRIP™ REINFORCED ADHESIVE SKIN CLOSURES, R1547, 1/2 IN X 4 IN (12 MM X 100 MM), 6 STRIPS/ENVELOPE: Brand: 3M™ STERI-STRIP™

## (undated) DEVICE — ANTIBACTERIAL UNDYED BRAIDED (POLYGLACTIN 910), SYNTHETIC ABSORBABLE SUTURE: Brand: COATED VICRYL

## (undated) DEVICE — PROXIMATE SKIN STAPLERS (35 WIDE) CONTAINS 35 STAINLESS STEEL STAPLES (FIXED HEAD): Brand: PROXIMATE

## (undated) DEVICE — NEEDLE 25G X 1 1/2

## (undated) DEVICE — SUT VICRYL 2-0 REEL 54 IN J286G

## (undated) DEVICE — TUBING SUCTION 5MM X 12 FT

## (undated) DEVICE — GLOVE SRG BIOGEL ECLIPSE 8